# Patient Record
Sex: FEMALE | Race: WHITE | NOT HISPANIC OR LATINO | URBAN - METROPOLITAN AREA
[De-identification: names, ages, dates, MRNs, and addresses within clinical notes are randomized per-mention and may not be internally consistent; named-entity substitution may affect disease eponyms.]

---

## 2017-12-22 ENCOUNTER — EMERGENCY (EMERGENCY)
Facility: HOSPITAL | Age: 32
LOS: 1 days | Discharge: HOME | End: 2017-12-22
Attending: EMERGENCY MEDICINE | Admitting: EMERGENCY MEDICINE
Payer: MEDICAID

## 2017-12-22 VITALS
OXYGEN SATURATION: 100 % | HEART RATE: 78 BPM | TEMPERATURE: 98 F | DIASTOLIC BLOOD PRESSURE: 88 MMHG | RESPIRATION RATE: 18 BRPM | SYSTOLIC BLOOD PRESSURE: 120 MMHG

## 2017-12-22 VITALS
SYSTOLIC BLOOD PRESSURE: 125 MMHG | OXYGEN SATURATION: 100 % | TEMPERATURE: 98 F | HEART RATE: 80 BPM | RESPIRATION RATE: 19 BRPM | DIASTOLIC BLOOD PRESSURE: 91 MMHG

## 2017-12-22 LAB
ALBUMIN SERPL ELPH-MCNC: 4.4 G/DL — SIGNIFICANT CHANGE UP (ref 3.3–5)
ALP SERPL-CCNC: 66 U/L — SIGNIFICANT CHANGE UP (ref 40–120)
ALT FLD-CCNC: 40 U/L — HIGH (ref 4–33)
APPEARANCE UR: CLEAR — SIGNIFICANT CHANGE UP
AST SERPL-CCNC: 20 U/L — SIGNIFICANT CHANGE UP (ref 4–32)
BASOPHILS # BLD AUTO: 0.03 K/UL — SIGNIFICANT CHANGE UP (ref 0–0.2)
BASOPHILS NFR BLD AUTO: 0.4 % — SIGNIFICANT CHANGE UP (ref 0–2)
BILIRUB SERPL-MCNC: 0.2 MG/DL — SIGNIFICANT CHANGE UP (ref 0.2–1.2)
BILIRUB UR-MCNC: NEGATIVE — SIGNIFICANT CHANGE UP
BLOOD UR QL VISUAL: HIGH
BUN SERPL-MCNC: 16 MG/DL — SIGNIFICANT CHANGE UP (ref 7–23)
CALCIUM SERPL-MCNC: 9.2 MG/DL — SIGNIFICANT CHANGE UP (ref 8.4–10.5)
CHLORIDE SERPL-SCNC: 105 MMOL/L — SIGNIFICANT CHANGE UP (ref 98–107)
CO2 SERPL-SCNC: 25 MMOL/L — SIGNIFICANT CHANGE UP (ref 22–31)
COLOR SPEC: YELLOW — SIGNIFICANT CHANGE UP
CREAT SERPL-MCNC: 0.71 MG/DL — SIGNIFICANT CHANGE UP (ref 0.5–1.3)
EOSINOPHIL # BLD AUTO: 0.02 K/UL — SIGNIFICANT CHANGE UP (ref 0–0.5)
EOSINOPHIL NFR BLD AUTO: 0.3 % — SIGNIFICANT CHANGE UP (ref 0–6)
GLUCOSE SERPL-MCNC: 88 MG/DL — SIGNIFICANT CHANGE UP (ref 70–99)
GLUCOSE UR-MCNC: NEGATIVE — SIGNIFICANT CHANGE UP
HCG SERPL-ACNC: < 5 MIU/ML — SIGNIFICANT CHANGE UP
HCT VFR BLD CALC: 40.7 % — SIGNIFICANT CHANGE UP (ref 34.5–45)
HGB BLD-MCNC: 13.8 G/DL — SIGNIFICANT CHANGE UP (ref 11.5–15.5)
IMM GRANULOCYTES # BLD AUTO: 0.02 # — SIGNIFICANT CHANGE UP
IMM GRANULOCYTES NFR BLD AUTO: 0.3 % — SIGNIFICANT CHANGE UP (ref 0–1.5)
KETONES UR-MCNC: NEGATIVE — SIGNIFICANT CHANGE UP
LEUKOCYTE ESTERASE UR-ACNC: NEGATIVE — SIGNIFICANT CHANGE UP
LYMPHOCYTES # BLD AUTO: 2.24 K/UL — SIGNIFICANT CHANGE UP (ref 1–3.3)
LYMPHOCYTES # BLD AUTO: 30.2 % — SIGNIFICANT CHANGE UP (ref 13–44)
MCHC RBC-ENTMCNC: 29.2 PG — SIGNIFICANT CHANGE UP (ref 27–34)
MCHC RBC-ENTMCNC: 33.9 % — SIGNIFICANT CHANGE UP (ref 32–36)
MCV RBC AUTO: 86 FL — SIGNIFICANT CHANGE UP (ref 80–100)
MONOCYTES # BLD AUTO: 0.48 K/UL — SIGNIFICANT CHANGE UP (ref 0–0.9)
MONOCYTES NFR BLD AUTO: 6.5 % — SIGNIFICANT CHANGE UP (ref 2–14)
MUCOUS THREADS # UR AUTO: SIGNIFICANT CHANGE UP
NEUTROPHILS # BLD AUTO: 4.63 K/UL — SIGNIFICANT CHANGE UP (ref 1.8–7.4)
NEUTROPHILS NFR BLD AUTO: 62.3 % — SIGNIFICANT CHANGE UP (ref 43–77)
NITRITE UR-MCNC: NEGATIVE — SIGNIFICANT CHANGE UP
NON-SQ EPI CELLS # UR AUTO: <1 — SIGNIFICANT CHANGE UP
NRBC # FLD: 0 — SIGNIFICANT CHANGE UP
PH UR: 6 — SIGNIFICANT CHANGE UP (ref 4.6–8)
PLATELET # BLD AUTO: 267 K/UL — SIGNIFICANT CHANGE UP (ref 150–400)
PMV BLD: 10.9 FL — SIGNIFICANT CHANGE UP (ref 7–13)
POTASSIUM SERPL-MCNC: 4.3 MMOL/L — SIGNIFICANT CHANGE UP (ref 3.5–5.3)
POTASSIUM SERPL-SCNC: 4.3 MMOL/L — SIGNIFICANT CHANGE UP (ref 3.5–5.3)
PROT SERPL-MCNC: 7.1 G/DL — SIGNIFICANT CHANGE UP (ref 6–8.3)
PROT UR-MCNC: 30 MG/DL — HIGH
RBC # BLD: 4.73 M/UL — SIGNIFICANT CHANGE UP (ref 3.8–5.2)
RBC # FLD: 12.3 % — SIGNIFICANT CHANGE UP (ref 10.3–14.5)
RBC CASTS # UR COMP ASSIST: SIGNIFICANT CHANGE UP (ref 0–?)
SODIUM SERPL-SCNC: 142 MMOL/L — SIGNIFICANT CHANGE UP (ref 135–145)
SP GR SPEC: 1.03 — SIGNIFICANT CHANGE UP (ref 1–1.04)
SQUAMOUS # UR AUTO: SIGNIFICANT CHANGE UP
UROBILINOGEN FLD QL: NORMAL MG/DL — SIGNIFICANT CHANGE UP
WBC # BLD: 7.42 K/UL — SIGNIFICANT CHANGE UP (ref 3.8–10.5)
WBC # FLD AUTO: 7.42 K/UL — SIGNIFICANT CHANGE UP (ref 3.8–10.5)
WBC UR QL: SIGNIFICANT CHANGE UP (ref 0–?)

## 2017-12-22 PROCEDURE — 76830 TRANSVAGINAL US NON-OB: CPT | Mod: 26

## 2017-12-22 PROCEDURE — 99284 EMERGENCY DEPT VISIT MOD MDM: CPT

## 2017-12-22 RX ORDER — KETOROLAC TROMETHAMINE 30 MG/ML
15 SYRINGE (ML) INJECTION ONCE
Qty: 0 | Refills: 0 | Status: DISCONTINUED | OUTPATIENT
Start: 2017-12-22 | End: 2017-12-22

## 2017-12-22 RX ORDER — IBUPROFEN 200 MG
1 TABLET ORAL
Qty: 28 | Refills: 0 | OUTPATIENT
Start: 2017-12-22 | End: 2017-12-28

## 2017-12-22 NOTE — ED PROVIDER NOTE - OBJECTIVE STATEMENT
32F  pmh PCOS, irregular menstrual cycle, here w/ right lower abdominal pain 7-8/10 that came about 3 days ago on its own. Since then it has been intermittent and comes and goes and nothing has helped it. Pt says there is associated nausea but no vomiting, fevers, cp, dysuria, hematuria, increased frwquency, or sob. BMs have been normal with no blood or diarrhea. Pt does say that 1.5 weeks ago she had an abnormally heavy period but with no clots. Pt is sexually active. Pt saw her pmd today who sent her here for a CT scan.

## 2017-12-22 NOTE — ED ADULT NURSE NOTE - OBJECTIVE STATEMENT
Pt A+OX3 c/o RLQ abd pain x2 days.  Denies N/V/D.  LMP 2 weeks ago.  Saw PMD who sent pt here to r/o appendicitis.  Kept NPO.  Urine specimen sent as ordered.

## 2017-12-22 NOTE — ED ADULT TRIAGE NOTE - CHIEF COMPLAINT QUOTE
Co rlq abdominal pain with nausea and poor appetite x 3 days. Seen by pcp today and sent to ED to ro appendicitis or ovarian cysts.

## 2017-12-22 NOTE — ED PROVIDER NOTE - PLAN OF CARE
1) Please follow-up with your primary care doctor within the next 3 days.  If you cannot follow-up with your doctor(s), please return to the ED for any urgent issues.  2) If you have any worsening of symptoms or any other concerns please return to the ED immediately.  3) Please continue taking your home medications as directed.  4) You may have been given a copy of your labs and/or imaging.  Please go over these with your primary care doctor.  5) A prescription was sent to your pharmacy. Please take as directed.

## 2017-12-22 NOTE — ED PROVIDER NOTE - ATTENDING CONTRIBUTION TO CARE
Dr. Garcia:  I have personally performed a face to face bedside history and physical examination of this patient. I have discussed the history, examination, review of systems, assessment and plan of management with the resident. I have reviewed the electronic medical record and amended it to reflect my history, review of systems, physical exam, assessment and plan.    32F h/o PCOS presents with RLQ/pelvic pain x 3 days, intermittent, worse after eating.  +nausea intermittently.  Denies fever/chills, cp, sob, v/d, urinary symptoms.  Sent by PCP for further evaluation.    Exam:  - nad  - rrr  - ctab  - abd soft, mild TTP RLQ  - pelvic exam (chaperone Dr. Santos) with no CMT, +R adnexal TTP    A/P  - pain more adnexal than RLQ, consider ovarian cyst/torsion, r/o pregnancy/ectopic  - cbc, cmp, hcg, ua, urine culture  - transvaginal ultrasound  - if US, will obtain CT to r/o appendicitis

## 2017-12-22 NOTE — ED PROVIDER NOTE - MEDICAL DECISION MAKING DETAILS
32F  F pmhx PCOS here w/ intermittent RLQ pain 8/10 x 3 days. Pt well appearing on exam but substantial ttp in adnexal region. Pelvic exam findings similar. No urinary or GI symptoms. Plan for abdominal U/S, cbc, cmp, serum hcg, UA.

## 2017-12-22 NOTE — ED PROVIDER NOTE - CARE PLAN
Principal Discharge DX:	Abdominal pain  Instructions for follow-up, activity and diet:	1) Please follow-up with your primary care doctor within the next 3 days.  If you cannot follow-up with your doctor(s), please return to the ED for any urgent issues.  2) If you have any worsening of symptoms or any other concerns please return to the ED immediately.  3) Please continue taking your home medications as directed.  4) You may have been given a copy of your labs and/or imaging.  Please go over these with your primary care doctor.  5) A prescription was sent to your pharmacy. Please take as directed.

## 2017-12-22 NOTE — ED PROVIDER NOTE - PROGRESS NOTE DETAILS
US shows polycystic ovaries but no other emergent findings.  Discussed obtaining CT to r/o appendicitis.  Pt states she would prefer to be discharged home secondary to [employer] observances for sun-down.  Understands risks of missed pathology by not obtaining CT at this time; states she will return to ED for any worsening signs/symptoms.  Given copy of results and return precautions.

## 2017-12-23 LAB — SPECIMEN SOURCE: SIGNIFICANT CHANGE UP

## 2017-12-24 LAB — BACTERIA UR CULT: SIGNIFICANT CHANGE UP

## 2019-01-03 ENCOUNTER — OUTPATIENT (OUTPATIENT)
Dept: OUTPATIENT SERVICES | Facility: HOSPITAL | Age: 34
LOS: 1 days | End: 2019-01-03

## 2019-01-03 VITALS
OXYGEN SATURATION: 99 % | SYSTOLIC BLOOD PRESSURE: 98 MMHG | HEART RATE: 90 BPM | DIASTOLIC BLOOD PRESSURE: 60 MMHG | RESPIRATION RATE: 18 BRPM | WEIGHT: 156.97 LBS | TEMPERATURE: 98 F | HEIGHT: 61 IN

## 2019-01-03 DIAGNOSIS — O34.32 MATERNAL CARE FOR CERVICAL INCOMPETENCE, SECOND TRIMESTER: ICD-10-CM

## 2019-01-03 DIAGNOSIS — N88.3 INCOMPETENCE OF CERVIX UTERI: ICD-10-CM

## 2019-01-03 LAB
BLD GP AB SCN SERPL QL: NEGATIVE — SIGNIFICANT CHANGE UP
HCT VFR BLD CALC: 34.1 % — LOW (ref 34.5–45)
HGB BLD-MCNC: 11.8 G/DL — SIGNIFICANT CHANGE UP (ref 11.5–15.5)
MCHC RBC-ENTMCNC: 29.9 PG — SIGNIFICANT CHANGE UP (ref 27–34)
MCHC RBC-ENTMCNC: 34.6 % — SIGNIFICANT CHANGE UP (ref 32–36)
MCV RBC AUTO: 86.3 FL — SIGNIFICANT CHANGE UP (ref 80–100)
NRBC # FLD: 0 — SIGNIFICANT CHANGE UP
PLATELET # BLD AUTO: 245 K/UL — SIGNIFICANT CHANGE UP (ref 150–400)
PMV BLD: 10.5 FL — SIGNIFICANT CHANGE UP (ref 7–13)
RBC # BLD: 3.95 M/UL — SIGNIFICANT CHANGE UP (ref 3.8–5.2)
RBC # FLD: 12.5 % — SIGNIFICANT CHANGE UP (ref 10.3–14.5)
RH IG SCN BLD-IMP: NEGATIVE — SIGNIFICANT CHANGE UP
WBC # BLD: 8.38 K/UL — SIGNIFICANT CHANGE UP (ref 3.8–10.5)
WBC # FLD AUTO: 8.38 K/UL — SIGNIFICANT CHANGE UP (ref 3.8–10.5)

## 2019-01-03 NOTE — H&P PST ADULT - HISTORY OF PRESENT ILLNESS
32 y/o female  at 11 weeks gestation  with hx of incompetent cervix and pre-term delivery in previous pregnancy scheduled for cerclage of cervix on 2019. She states she was diagnosed with strep throat last week and completed a course of penicillin.

## 2019-01-03 NOTE — H&P PST ADULT - PROBLEM SELECTOR PLAN 1
scheduled for cerclage of cervix on 01/17/2019.  Pre-Op instructions provided to patient.  Stop PNV 01/10/2019.

## 2019-01-07 ENCOUNTER — TRANSCRIPTION ENCOUNTER (OUTPATIENT)
Age: 34
End: 2019-01-07

## 2019-01-08 ENCOUNTER — TRANSCRIPTION ENCOUNTER (OUTPATIENT)
Age: 34
End: 2019-01-08

## 2019-01-08 ENCOUNTER — OUTPATIENT (OUTPATIENT)
Dept: INPATIENT UNIT | Facility: HOSPITAL | Age: 34
LOS: 1 days | Discharge: HOME | End: 2019-01-08

## 2019-01-08 VITALS
DIASTOLIC BLOOD PRESSURE: 61 MMHG | OXYGEN SATURATION: 99 % | HEART RATE: 88 BPM | SYSTOLIC BLOOD PRESSURE: 105 MMHG | RESPIRATION RATE: 20 BRPM

## 2019-01-08 VITALS
DIASTOLIC BLOOD PRESSURE: 67 MMHG | SYSTOLIC BLOOD PRESSURE: 100 MMHG | HEART RATE: 84 BPM | RESPIRATION RATE: 16 BRPM | TEMPERATURE: 99 F | OXYGEN SATURATION: 100 %

## 2019-01-08 VITALS — SYSTOLIC BLOOD PRESSURE: 97 MMHG | HEART RATE: 92 BPM | DIASTOLIC BLOOD PRESSURE: 59 MMHG

## 2019-01-08 DIAGNOSIS — O34.30 MATERNAL CARE FOR CERVICAL INCOMPETENCE, UNSPECIFIED TRIMESTER: ICD-10-CM

## 2019-01-08 LAB
BASOPHILS # BLD AUTO: 0.05 K/UL — SIGNIFICANT CHANGE UP (ref 0–0.2)
BASOPHILS NFR BLD AUTO: 0.5 % — SIGNIFICANT CHANGE UP (ref 0–2)
BLD GP AB SCN SERPL QL: NEGATIVE — SIGNIFICANT CHANGE UP
EOSINOPHIL # BLD AUTO: 0.05 K/UL — SIGNIFICANT CHANGE UP (ref 0–0.5)
EOSINOPHIL NFR BLD AUTO: 0.5 % — SIGNIFICANT CHANGE UP (ref 0–6)
HCT VFR BLD CALC: 37 % — SIGNIFICANT CHANGE UP (ref 34.5–45)
HGB BLD-MCNC: 12.6 G/DL — SIGNIFICANT CHANGE UP (ref 11.5–15.5)
IMM GRANULOCYTES NFR BLD AUTO: 0.5 % — SIGNIFICANT CHANGE UP (ref 0–1.5)
LYMPHOCYTES # BLD AUTO: 2.61 K/UL — SIGNIFICANT CHANGE UP (ref 1–3.3)
LYMPHOCYTES # BLD AUTO: 26.7 % — SIGNIFICANT CHANGE UP (ref 13–44)
MCHC RBC-ENTMCNC: 29.6 PG — SIGNIFICANT CHANGE UP (ref 27–34)
MCHC RBC-ENTMCNC: 34.1 % — SIGNIFICANT CHANGE UP (ref 32–36)
MCV RBC AUTO: 86.9 FL — SIGNIFICANT CHANGE UP (ref 80–100)
MONOCYTES # BLD AUTO: 0.59 K/UL — SIGNIFICANT CHANGE UP (ref 0–0.9)
MONOCYTES NFR BLD AUTO: 6 % — SIGNIFICANT CHANGE UP (ref 2–14)
NEUTROPHILS # BLD AUTO: 6.42 K/UL — SIGNIFICANT CHANGE UP (ref 1.8–7.4)
NEUTROPHILS NFR BLD AUTO: 65.8 % — SIGNIFICANT CHANGE UP (ref 43–77)
NRBC # FLD: 0 K/UL — LOW (ref 25–125)
PLATELET # BLD AUTO: 266 K/UL — SIGNIFICANT CHANGE UP (ref 150–400)
PMV BLD: 10.4 FL — SIGNIFICANT CHANGE UP (ref 7–13)
RBC # BLD: 4.26 M/UL — SIGNIFICANT CHANGE UP (ref 3.8–5.2)
RBC # FLD: 12.6 % — SIGNIFICANT CHANGE UP (ref 10.3–14.5)
RH IG SCN BLD-IMP: NEGATIVE — SIGNIFICANT CHANGE UP
WBC # BLD: 9.77 K/UL — SIGNIFICANT CHANGE UP (ref 3.8–10.5)
WBC # FLD AUTO: 9.77 K/UL — SIGNIFICANT CHANGE UP (ref 3.8–10.5)

## 2019-01-08 RX ORDER — CITRIC ACID/SODIUM CITRATE 300-500 MG
30 SOLUTION, ORAL ORAL ONCE
Qty: 0 | Refills: 0 | Status: COMPLETED | OUTPATIENT
Start: 2019-01-08 | End: 2019-01-08

## 2019-01-08 RX ORDER — SODIUM CHLORIDE 9 MG/ML
1000 INJECTION, SOLUTION INTRAVENOUS ONCE
Qty: 0 | Refills: 0 | Status: COMPLETED | OUTPATIENT
Start: 2019-01-08 | End: 2019-01-08

## 2019-01-08 RX ORDER — FAMOTIDINE 10 MG/ML
20 INJECTION INTRAVENOUS ONCE
Qty: 0 | Refills: 0 | Status: COMPLETED | OUTPATIENT
Start: 2019-01-08 | End: 2019-01-08

## 2019-01-08 RX ORDER — INDOMETHACIN 50 MG
50 CAPSULE ORAL ONCE
Qty: 0 | Refills: 0 | Status: COMPLETED | OUTPATIENT
Start: 2019-01-08 | End: 2019-01-08

## 2019-01-08 RX ORDER — METOCLOPRAMIDE HCL 10 MG
10 TABLET ORAL ONCE
Qty: 0 | Refills: 0 | Status: COMPLETED | OUTPATIENT
Start: 2019-01-08 | End: 2019-01-08

## 2019-01-08 RX ORDER — INDOMETHACIN 50 MG
1 CAPSULE ORAL
Qty: 4 | Refills: 0
Start: 2019-01-08

## 2019-01-08 RX ORDER — SODIUM CHLORIDE 9 MG/ML
1000 INJECTION, SOLUTION INTRAVENOUS
Qty: 0 | Refills: 0 | Status: DISCONTINUED | OUTPATIENT
Start: 2019-01-08 | End: 2019-01-08

## 2019-01-08 RX ORDER — ACETAMINOPHEN 500 MG
1000 TABLET ORAL ONCE
Qty: 0 | Refills: 0 | Status: COMPLETED | OUTPATIENT
Start: 2019-01-08 | End: 2019-01-08

## 2019-01-08 RX ORDER — INDOMETHACIN 50 MG
25 CAPSULE ORAL EVERY 6 HOURS
Qty: 0 | Refills: 0 | Status: DISCONTINUED | OUTPATIENT
Start: 2019-01-08 | End: 2019-01-08

## 2019-01-08 RX ADMIN — Medication 25 MILLIGRAM(S): at 16:01

## 2019-01-08 RX ADMIN — Medication 1000 MILLIGRAM(S): at 14:47

## 2019-01-08 RX ADMIN — SODIUM CHLORIDE 125 MILLILITER(S): 9 INJECTION, SOLUTION INTRAVENOUS at 15:12

## 2019-01-08 RX ADMIN — Medication 400 MILLIGRAM(S): at 14:32

## 2019-01-08 RX ADMIN — FAMOTIDINE 20 MILLIGRAM(S): 10 INJECTION INTRAVENOUS at 09:16

## 2019-01-08 RX ADMIN — SODIUM CHLORIDE 2000 MILLILITER(S): 9 INJECTION, SOLUTION INTRAVENOUS at 08:55

## 2019-01-08 RX ADMIN — Medication 10 MILLIGRAM(S): at 09:16

## 2019-01-08 RX ADMIN — Medication 50 MILLIGRAM(S): at 09:30

## 2019-01-08 RX ADMIN — Medication 30 MILLILITER(S): at 09:17

## 2019-01-08 NOTE — DISCHARGE NOTE ANTEPARTUM - MEDICATION SUMMARY - MEDICATIONS TO TAKE
I will START or STAY ON the medications listed below when I get home from the hospital:    indomethacin 25 mg oral capsule  -- 1 cap(s) by mouth every 6 hours  -- Indication: For contractions

## 2019-01-08 NOTE — DISCHARGE NOTE ANTEPARTUM - HOSPITAL COURSE
32 yo  15w3d presents for Shirodkar cerclage placement for history of incompetent cervix. Surgery uncomplicated. FHR postoperatively 152 bpm.

## 2019-01-08 NOTE — DISCHARGE NOTE ANTEPARTUM - PLAN OF CARE
Continuation of pregnancy Take indocin every 6 hours for 24 hours. No bedrest necessary. Contact your physician if you begin to have frequency CTX, LOF, vaginal bleeding.

## 2019-01-08 NOTE — DISCHARGE NOTE ANTEPARTUM - PATIENT PORTAL LINK FT
You can access the [facility] Patient [facility], offered by [facility], by registering with the following website: [email or web address]

## 2019-01-08 NOTE — DISCHARGE NOTE ANTEPARTUM - CARE PLAN
Principal Discharge DX:	Incompetent cervix  Goal:	Continuation of pregnancy  Assessment and plan of treatment:	Take indocin every 6 hours for 24 hours. No bedrest necessary. Contact your physician if you begin to have frequency CTX, LOF, vaginal bleeding.

## 2019-01-08 NOTE — DISCHARGE NOTE ANTEPARTUM - CARE PROVIDER_API CALL
Rony Ybarra), MaternalFetal Medicine; Obstetrics and Gynecology  [address] [address] [place] [address]  Room [address]  [place] [address]  Phone: [phone]  Fax: [phone]

## 2019-02-25 ENCOUNTER — OUTPATIENT (OUTPATIENT)
Dept: OUTPATIENT SERVICES | Age: 34
LOS: 1 days | Discharge: HOME | End: 2019-02-25

## 2019-02-26 ENCOUNTER — APPOINTMENT (OUTPATIENT)
Dept: PEDIATRIC CARDIOLOGY | Facility: CLINIC | Age: 34
End: 2019-02-26
Payer: MEDICAID

## 2019-02-26 PROCEDURE — 76820 UMBILICAL ARTERY ECHO: CPT

## 2019-02-26 PROCEDURE — 76825 ECHO EXAM OF FETAL HEART: CPT

## 2019-02-26 PROCEDURE — 76827 ECHO EXAM OF FETAL HEART: CPT

## 2019-02-26 PROCEDURE — 93325 DOPPLER ECHO COLOR FLOW MAPG: CPT | Mod: 59

## 2019-02-26 PROCEDURE — 99203 OFFICE O/P NEW LOW 30 MIN: CPT | Mod: 25

## 2019-03-11 ENCOUNTER — APPOINTMENT (OUTPATIENT)
Dept: PEDIATRIC CARDIOLOGY | Facility: CLINIC | Age: 34
End: 2019-03-11

## 2019-03-20 ENCOUNTER — OUTPATIENT (OUTPATIENT)
Dept: INPATIENT UNIT | Facility: HOSPITAL | Age: 34
LOS: 1 days | Discharge: HOME | End: 2019-03-20
Payer: MEDICAID

## 2019-03-20 DIAGNOSIS — O26.899 OTHER SPECIFIED PREGNANCY RELATED CONDITIONS, UNSPECIFIED TRIMESTER: ICD-10-CM

## 2019-03-20 DIAGNOSIS — Z3A.00 WEEKS OF GESTATION OF PREGNANCY NOT SPECIFIED: ICD-10-CM

## 2019-03-20 LAB
APPEARANCE UR: CLEAR — SIGNIFICANT CHANGE UP
BACTERIA # UR AUTO: SIGNIFICANT CHANGE UP
BILIRUB UR-MCNC: NEGATIVE — SIGNIFICANT CHANGE UP
BLOOD UR QL VISUAL: SIGNIFICANT CHANGE UP
COLOR SPEC: YELLOW — SIGNIFICANT CHANGE UP
GLUCOSE UR-MCNC: NEGATIVE — SIGNIFICANT CHANGE UP
KETONES UR-MCNC: SIGNIFICANT CHANGE UP
LEUKOCYTE ESTERASE UR-ACNC: NEGATIVE — SIGNIFICANT CHANGE UP
NITRITE UR-MCNC: NEGATIVE — SIGNIFICANT CHANGE UP
PH UR: 6 — SIGNIFICANT CHANGE UP (ref 5–8)
PROT UR-MCNC: 30 — SIGNIFICANT CHANGE UP
RBC CASTS # UR COMP ASSIST: HIGH (ref 0–?)
SP GR SPEC: 1.03 — SIGNIFICANT CHANGE UP (ref 1–1.04)
SQUAMOUS # UR AUTO: SIGNIFICANT CHANGE UP
UROBILINOGEN FLD QL: SIGNIFICANT CHANGE UP
WBC UR QL: HIGH (ref 0–?)

## 2019-03-20 PROCEDURE — 59025 FETAL NON-STRESS TEST: CPT | Mod: 26

## 2019-03-21 LAB — SPECIMEN SOURCE: SIGNIFICANT CHANGE UP

## 2019-03-22 LAB — BACTERIA UR CULT: SIGNIFICANT CHANGE UP

## 2019-05-13 ENCOUNTER — OUTPATIENT (OUTPATIENT)
Dept: INPATIENT UNIT | Facility: HOSPITAL | Age: 34
LOS: 1 days | Discharge: HOME | End: 2019-05-13
Payer: MEDICAID

## 2019-05-13 VITALS — HEART RATE: 84 BPM | SYSTOLIC BLOOD PRESSURE: 109 MMHG | DIASTOLIC BLOOD PRESSURE: 69 MMHG

## 2019-05-13 DIAGNOSIS — O26.899 OTHER SPECIFIED PREGNANCY RELATED CONDITIONS, UNSPECIFIED TRIMESTER: ICD-10-CM

## 2019-05-13 DIAGNOSIS — Z3A.00 WEEKS OF GESTATION OF PREGNANCY NOT SPECIFIED: ICD-10-CM

## 2019-05-13 LAB
APPEARANCE UR: CLEAR — SIGNIFICANT CHANGE UP
BACTERIA # UR AUTO: NEGATIVE — SIGNIFICANT CHANGE UP
BILIRUB UR-MCNC: NEGATIVE — SIGNIFICANT CHANGE UP
BLOOD UR QL VISUAL: NEGATIVE — SIGNIFICANT CHANGE UP
COLOR SPEC: SIGNIFICANT CHANGE UP
GLUCOSE UR-MCNC: NEGATIVE — SIGNIFICANT CHANGE UP
HYALINE CASTS # UR AUTO: NEGATIVE — SIGNIFICANT CHANGE UP
KETONES UR-MCNC: SIGNIFICANT CHANGE UP
LEUKOCYTE ESTERASE UR-ACNC: SIGNIFICANT CHANGE UP
NITRITE UR-MCNC: NEGATIVE — SIGNIFICANT CHANGE UP
PH UR: 6 — SIGNIFICANT CHANGE UP (ref 5–8)
PROT UR-MCNC: NEGATIVE — SIGNIFICANT CHANGE UP
RBC CASTS # UR COMP ASSIST: SIGNIFICANT CHANGE UP (ref 0–?)
SP GR SPEC: 1.01 — SIGNIFICANT CHANGE UP (ref 1–1.04)
SQUAMOUS # UR AUTO: SIGNIFICANT CHANGE UP
UROBILINOGEN FLD QL: NORMAL — SIGNIFICANT CHANGE UP
WBC UR QL: HIGH (ref 0–?)

## 2019-05-13 PROCEDURE — 59025 FETAL NON-STRESS TEST: CPT | Mod: 26

## 2019-05-13 RX ORDER — SODIUM CHLORIDE 9 MG/ML
1000 INJECTION, SOLUTION INTRAVENOUS ONCE
Refills: 0 | Status: COMPLETED | OUTPATIENT
Start: 2019-05-13 | End: 2019-05-13

## 2019-05-13 RX ADMIN — SODIUM CHLORIDE 2000 MILLILITER(S): 9 INJECTION, SOLUTION INTRAVENOUS at 21:47

## 2019-05-13 NOTE — OB PROVIDER TRIAGE NOTE - PMH
Incompetent cervix    Inguinal Hernia    Miscarriage  5 weeks gestation, 2007  Vaginal delivery  2008 F 6#

## 2019-05-13 NOTE — OB PROVIDER TRIAGE NOTE - HISTORY OF PRESENT ILLNESS
Dr. Ybarra's pt. is a 32y/o EGA 33.2wks . Pt. reports of lower back pain and pelvic pressure since last night. Pt. denies abdominal cramping, LOF, VB, urinary frequency or dysuria.

## 2019-05-13 NOTE — OB PROVIDER TRIAGE NOTE - ADDITIONAL INSTRUCTIONS
No evidence of  labor at this time. Discussed findings with Dr. Ybarra. Pt. d/c'd home. Pt. to follow up with next OB appointment on Friday. Pt. instructed to return to triage with increase abdominal cramping, LOF, VB, or decrease FM. Increase PO hydration encouraged. Daily kick counts reviewed.

## 2019-05-13 NOTE — OB PROVIDER TRIAGE NOTE - NSOBPROVIDERNOTE_OBGYN_ALL_OB_FT
Dr. Ybarra's pt. is a 34y/o EGA 33.2wks . Pt. reports of lower back pain and pelvic pressure since last night. Pt. denies abdominal cramping, LOF, VB, urinary frequency or dysuria.     AP: Cervical insuffiencey cerclage placed at 16wks  Medical Hx: Denies  Surgical Hx: Inguinal hernia repair as a child  OBGYN Hx:   SABx1    FT 3/31/2008 6-0  Primary  2011 TIUP @34wks 3-0, 4-0  Repeat  2013 FT 6-13 for IUGR  Meds:PNV  NKDA    Assessment/Plan  TAS: Cephalic presentation, posterior placenta, DELROY:10.03, EFW:1835gm, BPP:  Speculum: Pt. declined speculum exam  TVS: CL:2.3-2.5 no funneling or dynamic changes noted  U/A:Pending Dr. Ybarra's pt. is a 32y/o EGA 33.2wks . Pt. reports of lower back pain and pelvic pressure since last night. Pt. denies abdominal cramping, LOF, VB, urinary frequency or dysuria.     AP: Cervical insuffiencey cerclage placed at 16wks  Medical Hx: Denies  Surgical Hx: Inguinal hernia repair as a child  OBGYN Hx:   SABx1    FT 3/31/2008 6-0  Primary  2011 TIUP @34wks 3-0, 4-0  Repeat  2013 FT 6-13 for IUGR  Meds:PNV  NKDA    Assessment/Plan  TAS: Cephalic presentation, posterior placenta, DELROY:10.03, EFW:1835gm, BPP:  Speculum: Pt. declined speculum exam  TVS: CL:2.3-2.5 no funneling or dynamic changes noted  U/A: Ketone:Small, Blood:Neg., Nitrite:Neg., Leukocyte Esterase:Moderate, WBC:11-25  FHR:120bpm, moderate variability, accels noted, no decels Dr. Ybarra's pt. is a 34y/o EGA 33.2wks . Pt. reports of lower back pain and pelvic pressure since last night. Pt. denies abdominal cramping, LOF, VB, urinary frequency or dysuria.     AP: Cervical insuffiencey cerclage placed at 16wks  Medical Hx: Denies  Surgical Hx: Inguinal hernia repair as a child  OBGYN Hx:   SABx1    FT 3/31/2008 6-0  Primary  2011 TIUP @34wks 3-0, 4-0  Repeat  2013 FT 6-13 for IUGR  Meds:PNV  NKDA    Assessment/Plan  TAS: Cephalic presentation, posterior placenta, DELROY:10.03, EFW:1835gm, BPP:  Speculum: Pt. declined speculum exam  TVS: CL:2.3-2.5 no funneling or dynamic changes noted  U/A: Ketone:Small, Blood:Neg., Nitrite:Neg., Leukocyte Esterase:Moderate, WBC:11-25  FHR:120bpm, moderate variability, accels noted, no decels  Uterine irritability but now no contractions on TOCO    1L RL bolus initiated    No evidence of  labor at this time. Discussed findings with Dr. Ybarra. Pt. d/c'd home. Pt. to follow up with next OB appointment on Friday. Pt. instructed to return to triage with increase abdominal cramping, LOF, VB, or decrease FM. Increase PO hydration encouraged. Daily kick counts reviewed.

## 2019-05-13 NOTE — OB PROVIDER TRIAGE NOTE - NSHPPHYSICALEXAM_GEN_ALL_CORE
TAS: Cephalic presentation, posterior placenta, DELROY:10.03, EFW:1835gm, BPP:8/8  Speculum: Pt. declined speculum exam  TVS: CL:2.3-2.5 no funneling or dynamic changes noted

## 2019-06-05 ENCOUNTER — OUTPATIENT (OUTPATIENT)
Dept: OUTPATIENT SERVICES | Facility: HOSPITAL | Age: 34
LOS: 1 days | End: 2019-06-05
Payer: MEDICAID

## 2019-06-05 VITALS
RESPIRATION RATE: 16 BRPM | HEART RATE: 80 BPM | SYSTOLIC BLOOD PRESSURE: 100 MMHG | TEMPERATURE: 97 F | DIASTOLIC BLOOD PRESSURE: 68 MMHG | WEIGHT: 164.02 LBS | HEIGHT: 61.5 IN

## 2019-06-05 DIAGNOSIS — O34.32 MATERNAL CARE FOR CERVICAL INCOMPETENCE, SECOND TRIMESTER: ICD-10-CM

## 2019-06-05 DIAGNOSIS — Z98.890 OTHER SPECIFIED POSTPROCEDURAL STATES: ICD-10-CM

## 2019-06-05 LAB
APPEARANCE UR: CLEAR — SIGNIFICANT CHANGE UP
BACTERIA # UR AUTO: NEGATIVE — SIGNIFICANT CHANGE UP
BILIRUB UR-MCNC: NEGATIVE — SIGNIFICANT CHANGE UP
BLOOD UR QL VISUAL: NEGATIVE — SIGNIFICANT CHANGE UP
COLOR SPEC: SIGNIFICANT CHANGE UP
GLUCOSE UR-MCNC: NEGATIVE — SIGNIFICANT CHANGE UP
HCT VFR BLD CALC: 33.5 % — LOW (ref 34.5–45)
HGB BLD-MCNC: 11.2 G/DL — LOW (ref 11.5–15.5)
HYALINE CASTS # UR AUTO: NEGATIVE — SIGNIFICANT CHANGE UP
KETONES UR-MCNC: NEGATIVE — SIGNIFICANT CHANGE UP
LEUKOCYTE ESTERASE UR-ACNC: SIGNIFICANT CHANGE UP
MCHC RBC-ENTMCNC: 28.6 PG — SIGNIFICANT CHANGE UP (ref 27–34)
MCHC RBC-ENTMCNC: 33.4 % — SIGNIFICANT CHANGE UP (ref 32–36)
MCV RBC AUTO: 85.5 FL — SIGNIFICANT CHANGE UP (ref 80–100)
NITRITE UR-MCNC: NEGATIVE — SIGNIFICANT CHANGE UP
NRBC # FLD: 0 K/UL — SIGNIFICANT CHANGE UP (ref 0–0)
PH UR: 6.5 — SIGNIFICANT CHANGE UP (ref 5–8)
PLATELET # BLD AUTO: 245 K/UL — SIGNIFICANT CHANGE UP (ref 150–400)
PMV BLD: 11 FL — SIGNIFICANT CHANGE UP (ref 7–13)
PROT UR-MCNC: NEGATIVE — SIGNIFICANT CHANGE UP
RBC # BLD: 3.92 M/UL — SIGNIFICANT CHANGE UP (ref 3.8–5.2)
RBC # FLD: 13 % — SIGNIFICANT CHANGE UP (ref 10.3–14.5)
RBC CASTS # UR COMP ASSIST: SIGNIFICANT CHANGE UP (ref 0–?)
SP GR SPEC: 1.01 — SIGNIFICANT CHANGE UP (ref 1–1.04)
SQUAMOUS # UR AUTO: SIGNIFICANT CHANGE UP
UROBILINOGEN FLD QL: NORMAL — SIGNIFICANT CHANGE UP
WBC # BLD: 8.19 K/UL — SIGNIFICANT CHANGE UP (ref 3.8–10.5)
WBC # FLD AUTO: 8.19 K/UL — SIGNIFICANT CHANGE UP (ref 3.8–10.5)
WBC UR QL: SIGNIFICANT CHANGE UP (ref 0–?)

## 2019-06-05 NOTE — H&P PST ADULT - NEUROLOGICAL DETAILS
normal strength/responds to verbal commands/responds to pain/alert and oriented x 3/sensation intact

## 2019-06-05 NOTE — H&P PST ADULT - MALLAMPATI CLASS
Class I (easy) - visualization of the soft palate, fauces, uvula, and both anterior and posterior pillars Class I (easy) - visualization of the soft palate, fauces, uvula, and both anterior and posterior pillars/with phonation

## 2019-06-05 NOTE — H&P PST ADULT - NSICDXPROBLEM_GEN_ALL_CORE_FT
PROBLEM DIAGNOSES  Problem: History of cervical cerclage  Assessment and Plan: Pt given preop instructions for scheduled surgery   Repeat T&S ordered DOS

## 2019-06-05 NOTE — H&P PST ADULT - ASSIST WITH
ECMO Shift Summary:    Patient transferred from [facility] on [facility].  All equipment is functioning and alarms are appropriately set. RPM's 5906-8329 with flow range 3.03-3.18 L/min. Sweep gas is at 5 LPM and FiO2 75%. Circuit remains free of air.  Fibrin noted at connectors on venous side of the oxygenator. Cannulas are secure with no bleeding from site. Extremities are cool and mottled.     Significant Shift Events:    None.    Vent settings:  Resp: 0  Ventilation Mode: SPCPS  Rate Set (breaths/minute): 10 breaths/min  PEEP (cm H2O): 15 cmH2O  Pressure Support (cm H2O): 10 cmH2O  Oxygen Concentration (%): 40 %  Inspiratory Pressure Set (cm H2O): 25  Inspiratory Time (seconds): 0.9 sec.    Heparin is off due to bleeding concerns.  ACT's 196-237.    Urine output minimal and coffee colored, blood loss was small with slight oozing from line sites. Product given included one unit platelets for low count.      Intake/Output Summary (Last 24 hours) at 02/13/18 0741  Last data filed at 02/13/18 0700   Gross per 24 hour   Intake           455.41 ml   Output              170 ml   Net           285.41 ml       ECHO:  No results found for this or any previous visit.No results found for this or any previous visit.    CXR:  Recent Results (from the past 24 hour(s))   XR Chest Port 1 View    Impression    IMPRESSION:   1. Left-sided pneumothorax as well as bilateral subcutaneous emphysema  and pleural effusions.  2. Paucity of small bowel gas.  3. Lines and tubes as above.   XR Abdomen Port 1 View    Impression    IMPRESSION:   1. Left-sided pneumothorax as well as bilateral subcutaneous emphysema  and pleural effusions.  2. Paucity of small bowel gas.  3. Lines and tubes as above.       Labs:    Recent Labs  Lab 02/13/18  0710 02/13/18  0620 02/13/18  0518 02/13/18  0459 02/13/18  0444   PH 7.32* 7.26* 7.30*  --  7.32*   PCO2 41 50* 43  --  42   PO2 168* 158* 389*  --  505*   HCO3 21 22 21  --  22   O2PER 40 40 40.0 40.0 40       Lab  Results   Component Value Date    HGB 14.5 02/13/2018    PHGB 70 (H) 02/13/2018    PLT 49 (LL) 02/13/2018    FIBR 266 02/13/2018    INR 2.00 (H) 02/13/2018    PTT 72 (H) 02/13/2018    DD >20.0 (H) 02/13/2018    AXA <0.10 02/13/2018         Plan is to continue ECMO support.      Kimberlee Yates, RRT  2/13/2018 7:41 AM                 walking

## 2019-06-05 NOTE — H&P PST ADULT - NSICDXPASTMEDICALHX_GEN_ALL_CORE_FT
PAST MEDICAL HISTORY:  Incompetent cervix     Inguinal Hernia     Miscarriage 5 weeks gestation, 2007    Vaginal delivery 2008 F 6#

## 2019-06-05 NOTE — H&P PST ADULT - HISTORY OF PRESENT ILLNESS
34 y/o female  at 11 weeks gestation  with hx of incompetent cervix and pre-term delivery in previous pregnancy scheduled for cerclage of cervix on 2019. She states she was diagnosed with strep throat last week and completed a course of penicillin. Pt is a 33 yr old female scheduled for Cerclage removal 19 with Dr Ybarra - pt had Cerclage placed  and is now Gestation 37 weeks , JOHN 19, M1 - with   at 40 weeks,   at 34 weeks with twins,  at 40 weeks and a missed ab . Pt hx of Cerclage for all pregnancies. Pt denies vaginal bleeding or contractions at this time - Pt last seen by OB last week and fetal heartbeat heard and pt confirms fetal movement today.

## 2019-06-05 NOTE — H&P PST ADULT - NSICDXPASTSURGICALHX_GEN_ALL_CORE_FT
PAST SURGICAL HISTORY:  History of  x2    History of Cerclage, Currently Pregnant 2011    Inguinal Hernia Repair

## 2019-06-05 NOTE — H&P PST ADULT - NEGATIVE NEUROLOGICAL SYMPTOMS
no tremors/no transient paralysis/no weakness/no paresthesias/no generalized seizures/no focal seizures/no syncope

## 2019-06-06 LAB
ANTIBODY ID 1_1: SIGNIFICANT CHANGE UP
BLD GP AB SCN SERPL QL: POSITIVE — SIGNIFICANT CHANGE UP
DAT POLY-SP REAG RBC QL: NEGATIVE — SIGNIFICANT CHANGE UP
RH IG SCN BLD-IMP: NEGATIVE — SIGNIFICANT CHANGE UP
T PALLIDUM AB TITR SER: NEGATIVE — SIGNIFICANT CHANGE UP

## 2019-06-06 PROCEDURE — 86077 PHYS BLOOD BANK SERV XMATCH: CPT

## 2019-06-11 ENCOUNTER — TRANSCRIPTION ENCOUNTER (OUTPATIENT)
Age: 34
End: 2019-06-11

## 2019-06-11 ENCOUNTER — OUTPATIENT (OUTPATIENT)
Dept: INPATIENT UNIT | Facility: HOSPITAL | Age: 34
LOS: 1 days | End: 2019-06-11

## 2019-06-11 VITALS
TEMPERATURE: 98 F | SYSTOLIC BLOOD PRESSURE: 107 MMHG | RESPIRATION RATE: 20 BRPM | DIASTOLIC BLOOD PRESSURE: 69 MMHG | WEIGHT: 164.91 LBS | HEART RATE: 82 BPM | HEIGHT: 60 IN

## 2019-06-11 DIAGNOSIS — N88.3 INCOMPETENCE OF CERVIX UTERI: ICD-10-CM

## 2019-06-11 DIAGNOSIS — Z3A.00 WEEKS OF GESTATION OF PREGNANCY NOT SPECIFIED: ICD-10-CM

## 2019-06-11 DIAGNOSIS — O34.211 MATERNAL CARE FOR LOW TRANSVERSE SCAR FROM PREVIOUS CESAREAN DELIVERY: ICD-10-CM

## 2019-06-11 DIAGNOSIS — O34.30 MATERNAL CARE FOR CERVICAL INCOMPETENCE, UNSPECIFIED TRIMESTER: ICD-10-CM

## 2019-06-11 LAB
ANTIBODY ID 1_1: SIGNIFICANT CHANGE UP
BLD GP AB SCN SERPL QL: POSITIVE — SIGNIFICANT CHANGE UP
DAT POLY-SP REAG RBC QL: NEGATIVE — SIGNIFICANT CHANGE UP
RH IG SCN BLD-IMP: NEGATIVE — SIGNIFICANT CHANGE UP

## 2019-06-11 RX ORDER — FAMOTIDINE 10 MG/ML
20 INJECTION INTRAVENOUS ONCE
Refills: 0 | Status: DISCONTINUED | OUTPATIENT
Start: 2019-06-11 | End: 2019-06-11

## 2019-06-11 RX ORDER — SODIUM CHLORIDE 9 MG/ML
1000 INJECTION, SOLUTION INTRAVENOUS ONCE
Refills: 0 | Status: DISCONTINUED | OUTPATIENT
Start: 2019-06-11 | End: 2019-06-11

## 2019-06-11 RX ORDER — SODIUM CHLORIDE 9 MG/ML
1000 INJECTION, SOLUTION INTRAVENOUS
Refills: 0 | Status: DISCONTINUED | OUTPATIENT
Start: 2019-06-11 | End: 2019-06-11

## 2019-06-11 RX ORDER — METOCLOPRAMIDE HCL 10 MG
10 TABLET ORAL ONCE
Refills: 0 | Status: DISCONTINUED | OUTPATIENT
Start: 2019-06-11 | End: 2019-06-11

## 2019-06-11 RX ORDER — CITRIC ACID/SODIUM CITRATE 300-500 MG
30 SOLUTION, ORAL ORAL ONCE
Refills: 0 | Status: DISCONTINUED | OUTPATIENT
Start: 2019-06-11 | End: 2019-06-11

## 2019-06-11 NOTE — OB PROVIDER H&P - HISTORY OF PRESENT ILLNESS
34yo female  EDC 19 presents@ 37.3wks for scheduled cerclage removal. Cerclage placed 19  +AP course otherwise unremarkable.   Denies VB,ROM or UCs today.  +FM

## 2019-06-11 NOTE — OB PROVIDER H&P - ASSESSMENT
Admit to L&D  mary grace cerclage removal  NPO  routine labs  EFM/TOCO  anesthesia consult  Gina Hammond PAC  06-11-19 @ 13:01

## 2019-06-11 NOTE — OB PROVIDER H&P - NSHPPHYSICALEXAM_GEN_ALL_CORE
T(C): 36.6 (06-11-19 @ 11:56), Max: 36.6 (06-11-19 @ 11:56)    RR: 20 (06-11-19 @ 11:56) (20 - 20)    Heart: RRR, S1&S2, no S3  Lungs: Clear bilateral to auscultation, good inspiratory /expiratory effort              no rhonchi, no rales  Abd: Gravid  EFM:  110 bpm/moderate variabilty/+accelerations/no decelerations/CAT 1  TOCO:  no UC  Ext: FROM, minimal edema

## 2019-06-11 NOTE — CHART NOTE - NSCHARTNOTEFT_GEN_A_CORE
PACU PA NOTE  Attending rescheduled cerclage removal 6/12/19 in am due to emergency in L&D.  Pt was advised and given instruction to return to hospital in am. NPO after midnight. Clear fluids in am until 2 hrs prior to hospital arrival  NST performed - 110bpm/mod variability/no decels/good accelarations/CAT 1  Gina Hammond PAC

## 2019-06-11 NOTE — DISCHARGE NOTE ANTEPARTUM - MEDICATION SUMMARY - MEDICATIONS TO TAKE
I will START or STAY ON the medications listed below when I get home from the hospital:    Prenatal 1 oral capsule  -- Indication: For Maternal care for cervical incompetence

## 2019-06-11 NOTE — DISCHARGE NOTE ANTEPARTUM - HOSPITAL COURSE
34yo  [facility] 19 present @ 37.3wks for scheduled cerclage removal.  Due to unforeseen emergency in Labor and Delivery , attending rescheduled removal for 19.  NST was performed  Pt was discharged in stable condition with instructions to return to hospital in am @ 8am.  Pt expressed understanding and discharged in stable condition  Gina Hammond PAC

## 2019-06-11 NOTE — DISCHARGE NOTE ANTEPARTUM - PATIENT PORTAL LINK FT
You can access the [facility] Patient Portal, offered by [facility], by registering with the following website: [email or web address]

## 2019-06-11 NOTE — DISCHARGE NOTE ANTEPARTUM - CARE PROVIDER_API CALL
Rony Ybarra)  MaternalFetal Medicine; Obstetrics and Gynecology  [address] [address] [place] [address], Room [address]  [place] [address]  Phone: [phone]  Fax: [phone]  Follow Up Time:

## 2019-06-12 ENCOUNTER — TRANSCRIPTION ENCOUNTER (OUTPATIENT)
Age: 34
End: 2019-06-12

## 2019-06-12 ENCOUNTER — OUTPATIENT (OUTPATIENT)
Dept: INPATIENT UNIT | Facility: HOSPITAL | Age: 34
LOS: 1 days | Discharge: HOME | End: 2019-06-12

## 2019-06-12 VITALS — DIASTOLIC BLOOD PRESSURE: 67 MMHG | HEART RATE: 83 BPM | SYSTOLIC BLOOD PRESSURE: 112 MMHG

## 2019-06-12 VITALS — TEMPERATURE: 98 F

## 2019-06-12 DIAGNOSIS — O34.32 MATERNAL CARE FOR CERVICAL INCOMPETENCE, SECOND TRIMESTER: ICD-10-CM

## 2019-06-12 RX ORDER — METOCLOPRAMIDE HCL 10 MG
10 TABLET ORAL ONCE
Refills: 0 | Status: DISCONTINUED | OUTPATIENT
Start: 2019-06-12 | End: 2019-06-12

## 2019-06-12 RX ORDER — CITRIC ACID/SODIUM CITRATE 300-500 MG
30 SOLUTION, ORAL ORAL ONCE
Refills: 0 | Status: DISCONTINUED | OUTPATIENT
Start: 2019-06-12 | End: 2019-06-12

## 2019-06-12 RX ORDER — FAMOTIDINE 10 MG/ML
20 INJECTION INTRAVENOUS ONCE
Refills: 0 | Status: DISCONTINUED | OUTPATIENT
Start: 2019-06-12 | End: 2019-06-12

## 2019-06-12 RX ORDER — SODIUM CHLORIDE 9 MG/ML
1000 INJECTION, SOLUTION INTRAVENOUS ONCE
Refills: 0 | Status: DISCONTINUED | OUTPATIENT
Start: 2019-06-12 | End: 2019-06-12

## 2019-06-12 RX ORDER — SODIUM CHLORIDE 9 MG/ML
1000 INJECTION, SOLUTION INTRAVENOUS
Refills: 0 | Status: DISCONTINUED | OUTPATIENT
Start: 2019-06-12 | End: 2019-06-12

## 2019-06-12 NOTE — OB PROVIDER H&P - ASSESSMENT
Admit to L&D  scheduled cerclage removal  NPO  routine labs  EFM/TOCO  Bedside TLTAS  anesthesia consult  Gina Hammond PAC  06-12-19 @ 09:56

## 2019-06-12 NOTE — OB PROVIDER H&P - NSHPPHYSICALEXAM_GEN_ALL_CORE
T(C): 36.6 (06-11-19 @ 11:56), Max: 36.6 (06-11-19 @ 11:56)  HR: 87 (06-12-19 @ 09:46) (82 - 87)  BP: 104/57 (06-12-19 @ 09:46) (104/57 - 107/69)  RR: 20 (06-11-19 @ 11:56) (20 - 20)    Heart: RRR, S1&S2, no S3  Lungs: Clear bilateral to auscultation, good inspiratory /expiratory effort              no rhonchi, no rales  Abd: Gravid  EFM:  145 bpm/moderate variabilty/+accelerations/no decelerations/CAT 1  TOCO:  no UC  Ext: FROM, minimal edema

## 2019-06-12 NOTE — DISCHARGE NOTE ANTEPARTUM - PATIENT PORTAL LINK FT
You can access the [facility] Patient Portal, offered by [facility], by registering with the following website: [email or web address]

## 2019-06-12 NOTE — OB PROVIDER H&P - NS_OBGYNHISTORY_OBGYN_ALL_OB_FT
OBHx:            2013 RADHA-Max@ 37wks           2011- PLTCS- IUGR           3/31/2008- NVD          sABx 1

## 2019-06-12 NOTE — DISCHARGE NOTE ANTEPARTUM - HOSPITAL COURSE
34yo female  EDC 19 w. cerclage in place.    Cerclage was removed successfully without anesthesia  NST- Cat1 before discharge home  f/u with provider as discussed  continue PNV  Return to hospital if any VB, UC's or decreased fetal movement  Gina Hammond PAC

## 2019-06-12 NOTE — OB PROVIDER H&P - HISTORY OF PRESENT ILLNESS
34yo female  EDC 19 presents@ 37.3wks for scheduled cerclage removal. Rescheduled from 19.Cerclage placed 19  +AP course otherwise unremarkable.   Denies VB,ROM or UCs today.  +FM

## 2019-06-12 NOTE — DISCHARGE NOTE ANTEPARTUM - CARE PLAN
Principal Discharge DX:	Incompetent cervix  Goal:	TOLAC  Assessment and plan of treatment:	incompetant cervix  suture removed without incident  Secondary Diagnosis:	Cervical cerclage suture present, antepartum

## 2019-06-12 NOTE — DISCHARGE NOTE ANTEPARTUM - CARE PROVIDER_API CALL
Rony Ybarra)  MaternalFetal Medicine; Obstetrics and Gynecology  [address] [address] [place] [address], Room [address]  [place] [address]  Phone: [phone]  Fax: [phone]  Follow Up Time:

## 2019-06-12 NOTE — DISCHARGE NOTE ANTEPARTUM - MEDICATION SUMMARY - MEDICATIONS TO TAKE
I will START or STAY ON the medications listed below when I get home from the hospital:    Prenatal 1 oral capsule  -- Indication: For Maternal care for cervical incompetence in second trimester

## 2019-06-15 ENCOUNTER — INPATIENT (INPATIENT)
Facility: HOSPITAL | Age: 34
LOS: 2 days | Discharge: HOME | End: 2019-06-18
Attending: OBSTETRICS & GYNECOLOGY | Admitting: OBSTETRICS & GYNECOLOGY
Payer: MEDICAID

## 2019-06-15 ENCOUNTER — EMERGENCY (EMERGENCY)
Facility: HOSPITAL | Age: 34
LOS: 1 days | Discharge: STILL ADMITTED OR ADMINISTRATIVE | End: 2019-06-15
Admitting: EMERGENCY MEDICINE

## 2019-06-15 VITALS
OXYGEN SATURATION: 100 % | TEMPERATURE: 98 F | SYSTOLIC BLOOD PRESSURE: 109 MMHG | RESPIRATION RATE: 18 BRPM | HEART RATE: 84 BPM | DIASTOLIC BLOOD PRESSURE: 72 MMHG

## 2019-06-15 VITALS
RESPIRATION RATE: 18 BRPM | SYSTOLIC BLOOD PRESSURE: 118 MMHG | TEMPERATURE: 99 F | HEART RATE: 93 BPM | DIASTOLIC BLOOD PRESSURE: 80 MMHG

## 2019-06-15 DIAGNOSIS — O42.92 FULL-TERM PREMATURE RUPTURE OF MEMBRANES, UNSPECIFIED AS TO LENGTH OF TIME BETWEEN RUPTURE AND ONSET OF LABOR: ICD-10-CM

## 2019-06-15 DIAGNOSIS — Z3A.00 WEEKS OF GESTATION OF PREGNANCY NOT SPECIFIED: ICD-10-CM

## 2019-06-15 DIAGNOSIS — O26.899 OTHER SPECIFIED PREGNANCY RELATED CONDITIONS, UNSPECIFIED TRIMESTER: ICD-10-CM

## 2019-06-15 LAB
ANTIBODY ID 1_1: SIGNIFICANT CHANGE UP
BASOPHILS # BLD AUTO: 0.02 K/UL — SIGNIFICANT CHANGE UP (ref 0–0.2)
BASOPHILS NFR BLD AUTO: 0.2 % — SIGNIFICANT CHANGE UP (ref 0–2)
BLD GP AB SCN SERPL QL: POSITIVE — SIGNIFICANT CHANGE UP
DAT POLY-SP REAG RBC QL: NEGATIVE — SIGNIFICANT CHANGE UP
EOSINOPHIL # BLD AUTO: 0.02 K/UL — SIGNIFICANT CHANGE UP (ref 0–0.5)
EOSINOPHIL NFR BLD AUTO: 0.2 % — SIGNIFICANT CHANGE UP (ref 0–6)
HCT VFR BLD CALC: 33.8 % — LOW (ref 34.5–45)
HGB BLD-MCNC: 11.2 G/DL — LOW (ref 11.5–15.5)
IMM GRANULOCYTES NFR BLD AUTO: 0.5 % — SIGNIFICANT CHANGE UP (ref 0–1.5)
LYMPHOCYTES # BLD AUTO: 1.8 K/UL — SIGNIFICANT CHANGE UP (ref 1–3.3)
LYMPHOCYTES # BLD AUTO: 22.2 % — SIGNIFICANT CHANGE UP (ref 13–44)
MCHC RBC-ENTMCNC: 28 PG — SIGNIFICANT CHANGE UP (ref 27–34)
MCHC RBC-ENTMCNC: 33.1 % — SIGNIFICANT CHANGE UP (ref 32–36)
MCV RBC AUTO: 84.5 FL — SIGNIFICANT CHANGE UP (ref 80–100)
MONOCYTES # BLD AUTO: 0.66 K/UL — SIGNIFICANT CHANGE UP (ref 0–0.9)
MONOCYTES NFR BLD AUTO: 8.2 % — SIGNIFICANT CHANGE UP (ref 2–14)
NEUTROPHILS # BLD AUTO: 5.55 K/UL — SIGNIFICANT CHANGE UP (ref 1.8–7.4)
NEUTROPHILS NFR BLD AUTO: 68.7 % — SIGNIFICANT CHANGE UP (ref 43–77)
NRBC # FLD: 0 K/UL — SIGNIFICANT CHANGE UP (ref 0–0)
PLATELET # BLD AUTO: 242 K/UL — SIGNIFICANT CHANGE UP (ref 150–400)
PMV BLD: 10.9 FL — SIGNIFICANT CHANGE UP (ref 7–13)
RBC # BLD: 4 M/UL — SIGNIFICANT CHANGE UP (ref 3.8–5.2)
RBC # FLD: 12.9 % — SIGNIFICANT CHANGE UP (ref 10.3–14.5)
RH IG SCN BLD-IMP: NEGATIVE — SIGNIFICANT CHANGE UP
WBC # BLD: 8.09 K/UL — SIGNIFICANT CHANGE UP (ref 3.8–10.5)
WBC # FLD AUTO: 8.09 K/UL — SIGNIFICANT CHANGE UP (ref 3.8–10.5)

## 2019-06-15 RX ORDER — DIPHENHYDRAMINE HCL 50 MG
25 CAPSULE ORAL ONCE
Refills: 0 | Status: COMPLETED | OUTPATIENT
Start: 2019-06-15 | End: 2019-06-15

## 2019-06-15 RX ORDER — SODIUM CHLORIDE 9 MG/ML
1000 INJECTION, SOLUTION INTRAVENOUS
Refills: 0 | Status: DISCONTINUED | OUTPATIENT
Start: 2019-06-15 | End: 2019-06-16

## 2019-06-15 RX ORDER — OXYTOCIN 10 UNIT/ML
333.33 VIAL (ML) INJECTION
Qty: 20 | Refills: 0 | Status: DISCONTINUED | OUTPATIENT
Start: 2019-06-15 | End: 2019-06-17

## 2019-06-15 RX ORDER — CITRIC ACID/SODIUM CITRATE 300-500 MG
15 SOLUTION, ORAL ORAL EVERY 6 HOURS
Refills: 0 | Status: DISCONTINUED | OUTPATIENT
Start: 2019-06-15 | End: 2019-06-16

## 2019-06-15 RX ADMIN — Medication 25 MILLIGRAM(S): at 23:04

## 2019-06-15 RX ADMIN — SODIUM CHLORIDE 125 MILLILITER(S): 9 INJECTION, SOLUTION INTRAVENOUS at 17:49

## 2019-06-15 NOTE — CHART NOTE - NSCHARTNOTEFT_GEN_A_CORE
Decel to 60s lasting 2mins after contraction  Pt evaluated at bedside    VE 3/70/-3  FHT resolved spontaneously     34yo P4 at 38w 2 prior C/S admitted with SROM, in latent labor. Pt desires TOLAC  Fetus Category 2 with prolonged decel x1, but overall reassuring with moderate variability and accelerations.  Intrauterine resuscitation initiated - lateral positioning, O2 face mask, IVF hydration  - monitor EFM/toco  - for epidural, anesthesiologist notified  - VE in 2hrs. If no cervical change, will begin pitocin augmentation    D/w Dr. Warner  S Ewguzman, R3

## 2019-06-15 NOTE — CHART NOTE - NSCHARTNOTEFT_GEN_A_CORE
Patient evaluated at bedside.  She is comfortable with epidural    VE 3-4/70/-3    EFM 130bpm, moderate variability, +Accels, intermittent decels  irreg contractions q2-5mins    Pt is making cervical change.   Fetus Category 2 tracing with intermittent variable decels and late x2 spontaneously recovered.  Will continue expectant management at this time    - continue intrauterine resuscitation  - will re-examine, and augment as indicated    D/w Dr. Warner  S Ewumi, R3

## 2019-06-15 NOTE — OB PROVIDER TRIAGE NOTE - NSOBPROVIDERNOTE_OBGYN_ALL_OB_FT
34 yo , EGA@38 weeks, PPROM, not in labor.  History of 2  sections; Patient desires to TOLAC.  Cerclage was removed on 2019.  Upon admission: SVE: /-2, clear fluid, category 1 FHTs, no contractions; EFW 3600 g  Case was reviewed with Dr Ybarra-->was advised to allow TOLAC if spontaneous labor; not a candidate for IOL  Plan of care will consult with Dr Barreto.

## 2019-06-15 NOTE — OB PROVIDER H&P - NS_OBGYNHISTORY_OBGYN_ALL_OB_FT
2008, , full term, [id number], cerclage for cervical insufficiency  ,  section, 34 weeks, PPROM, 3lbs/4lbs; cerclage for cervical insufficiency  , repeat  section, full term, [id number]; cerclage for cervical insufficiency  , SAB, 6 weeks, D&C

## 2019-06-15 NOTE — OB RN PATIENT PROFILE - ALERT: PERTINENT HISTORY
Ultra Screen at 12 Weeks/1st Trimester Sonogram/Fetal Non-Stress Test (NST)/20 Week Level II Sonogram

## 2019-06-15 NOTE — OB PROVIDER H&P - PROBLEM SELECTOR PLAN 1
admit for expectant management 34 yo , EGA@38 weeks, PPROM, not in labor.  History of 2  sections; Patient desires to TOLAC.   TOLAC consent was signed and in the chart.  Cerclage was removed on 2019.  Upon admission: SVE: /-2, clear fluid, category 1 FHTs, no contractions; EFW 3600 g  Case was reviewed with Dr Ybarra-->was advised to allow TOLAC if spontaneous labor; not a candidate for IOL  Plan of care will consult with Dr Barreto.

## 2019-06-15 NOTE — OB PROVIDER H&P - ALERT: PERTINENT HISTORY
20 Week Level II Sonogram/Fetal Non-Stress Test (NST)/1st Trimester Sonogram/Ultra Screen at 12 Weeks

## 2019-06-15 NOTE — ED ADULT TRIAGE NOTE - CHIEF COMPLAINT QUOTE
38 weeks pregnant 5th child pts water broke no vag bleed or abd pain l and called and todl to send pt up

## 2019-06-15 NOTE — CHART NOTE - NSCHARTNOTEFT_GEN_A_CORE
R3 Progress Note    Pt evaluated for a 2 min prolonged decel that recovered with LL tilt, O2.  SVE 4/80/-2. EFM: 140 bpm/mod jenni/+accel/ intermittent decels/variables. Bernice: irreg    Vital Signs Last 24 Hrs  T(C): 36.8 (15 Bay 2019 23:44), Max: 37 (15 Aby 2019 15:01)  T(F): 98.24 (15 Bay 2019 23:44), Max: 98.6 (15 Bay 2019 15:01)  HR: 68 (15 Bay 2019 23:55) (64 - 100)  BP: 114/69 (15 Bay 2019 23:41) (108/69 - 141/88)  BP(mean): --  RR: 16 (15 Bay 2019 17:32) (16 - 18)  SpO2: 100% (15 Bay 2019 23:55) (90% - 100%)    I&O's Detail    15 Bay 2019 07:01  -  15 Bay 2019 23:59  --------------------------------------------------------  IN:    lactated ringers.: 1375 mL  Total IN: 1375 mL    OUT:  Total OUT: 0 mL    Total NET: 1375 mL                                11.2   8.09  )-----------( 242      ( 15 Bay 2019 16:15 )             33.8           Plan  Cat2 tracing, resuscitate with lateral tilt, O2, IVF bolus  Discussed possible c/s due to cat 2 FHT if it continues. Pt upset and currently refusing c/s.   epidural in place, working.    D/w Dr. Warner, en route to  pt re: repeat c/s  JIMMY Carrillo PGY2

## 2019-06-15 NOTE — OB PROVIDER TRIAGE NOTE - NS_OBGYNHISTORY_OBGYN_ALL_OB_FT
2008, , full term, [id number], cerclage for cervical insufficiency  ,  section, 34 weeks, PPROM, 3lbs/4lbs; cerclage for cervical insufficiency  , repeat  section, full term, [id number]; cerclage for cervical insufficiency  , SAB, 6 weeks, D&C

## 2019-06-16 DIAGNOSIS — O42.10 PREMATURE RUPTURE OF MEMBRANES, ONSET OF LABOR MORE THAN 24 HOURS FOLLOWING RUPTURE, UNSPECIFIED WEEKS OF GESTATION: ICD-10-CM

## 2019-06-16 LAB — T PALLIDUM AB TITR SER: NEGATIVE — SIGNIFICANT CHANGE UP

## 2019-06-16 PROCEDURE — 86077 PHYS BLOOD BANK SERV XMATCH: CPT

## 2019-06-16 RX ORDER — ONDANSETRON 8 MG/1
4 TABLET, FILM COATED ORAL ONCE
Refills: 0 | Status: COMPLETED | OUTPATIENT
Start: 2019-06-16 | End: 2019-06-16

## 2019-06-16 RX ORDER — HYDROCORTISONE 1 %
1 OINTMENT (GRAM) TOPICAL EVERY 6 HOURS
Refills: 0 | Status: DISCONTINUED | OUTPATIENT
Start: 2019-06-16 | End: 2019-06-18

## 2019-06-16 RX ORDER — GLYCERIN ADULT
1 SUPPOSITORY, RECTAL RECTAL AT BEDTIME
Refills: 0 | Status: DISCONTINUED | OUTPATIENT
Start: 2019-06-16 | End: 2019-06-18

## 2019-06-16 RX ORDER — ACETAMINOPHEN 500 MG
975 TABLET ORAL EVERY 6 HOURS
Refills: 0 | Status: DISCONTINUED | OUTPATIENT
Start: 2019-06-16 | End: 2019-06-18

## 2019-06-16 RX ORDER — IBUPROFEN 200 MG
600 TABLET ORAL EVERY 6 HOURS
Refills: 0 | Status: COMPLETED | OUTPATIENT
Start: 2019-06-16 | End: 2020-05-14

## 2019-06-16 RX ORDER — KETOROLAC TROMETHAMINE 30 MG/ML
30 SYRINGE (ML) INJECTION ONCE
Refills: 0 | Status: DISCONTINUED | OUTPATIENT
Start: 2019-06-16 | End: 2019-06-16

## 2019-06-16 RX ORDER — MAGNESIUM HYDROXIDE 400 MG/1
30 TABLET, CHEWABLE ORAL
Refills: 0 | Status: DISCONTINUED | OUTPATIENT
Start: 2019-06-16 | End: 2019-06-18

## 2019-06-16 RX ORDER — OXYCODONE HYDROCHLORIDE 5 MG/1
5 TABLET ORAL ONCE
Refills: 0 | Status: DISCONTINUED | OUTPATIENT
Start: 2019-06-16 | End: 2019-06-18

## 2019-06-16 RX ORDER — GENTAMICIN SULFATE 40 MG/ML
280 VIAL (ML) INJECTION ONCE
Refills: 0 | Status: COMPLETED | OUTPATIENT
Start: 2019-06-16 | End: 2019-06-16

## 2019-06-16 RX ORDER — DIPHENHYDRAMINE HCL 50 MG
25 CAPSULE ORAL EVERY 6 HOURS
Refills: 0 | Status: DISCONTINUED | OUTPATIENT
Start: 2019-06-16 | End: 2019-06-18

## 2019-06-16 RX ORDER — BENZOCAINE 10 %
1 GEL (GRAM) MUCOUS MEMBRANE EVERY 6 HOURS
Refills: 0 | Status: DISCONTINUED | OUTPATIENT
Start: 2019-06-16 | End: 2019-06-18

## 2019-06-16 RX ORDER — ACETAMINOPHEN 500 MG
975 TABLET ORAL ONCE
Refills: 0 | Status: COMPLETED | OUTPATIENT
Start: 2019-06-16 | End: 2019-06-16

## 2019-06-16 RX ORDER — TETANUS TOXOID, REDUCED DIPHTHERIA TOXOID AND ACELLULAR PERTUSSIS VACCINE, ADSORBED 5; 2.5; 8; 8; 2.5 [IU]/.5ML; [IU]/.5ML; UG/.5ML; UG/.5ML; UG/.5ML
0.5 SUSPENSION INTRAMUSCULAR ONCE
Refills: 0 | Status: DISCONTINUED | OUTPATIENT
Start: 2019-06-16 | End: 2019-06-18

## 2019-06-16 RX ORDER — PRAMOXINE HYDROCHLORIDE 150 MG/15G
1 AEROSOL, FOAM RECTAL EVERY 4 HOURS
Refills: 0 | Status: DISCONTINUED | OUTPATIENT
Start: 2019-06-16 | End: 2019-06-18

## 2019-06-16 RX ORDER — SODIUM CHLORIDE 9 MG/ML
3 INJECTION INTRAMUSCULAR; INTRAVENOUS; SUBCUTANEOUS EVERY 8 HOURS
Refills: 0 | Status: DISCONTINUED | OUTPATIENT
Start: 2019-06-16 | End: 2019-06-18

## 2019-06-16 RX ORDER — SIMETHICONE 80 MG/1
80 TABLET, CHEWABLE ORAL EVERY 4 HOURS
Refills: 0 | Status: DISCONTINUED | OUTPATIENT
Start: 2019-06-16 | End: 2019-06-18

## 2019-06-16 RX ORDER — OXYTOCIN 10 UNIT/ML
333.33 VIAL (ML) INJECTION
Qty: 20 | Refills: 0 | Status: DISCONTINUED | OUTPATIENT
Start: 2019-06-16 | End: 2019-06-17

## 2019-06-16 RX ORDER — AMPICILLIN TRIHYDRATE 250 MG
CAPSULE ORAL
Refills: 0 | Status: DISCONTINUED | OUTPATIENT
Start: 2019-06-16 | End: 2019-06-16

## 2019-06-16 RX ORDER — OXYCODONE HYDROCHLORIDE 5 MG/1
5 TABLET ORAL
Refills: 0 | Status: DISCONTINUED | OUTPATIENT
Start: 2019-06-16 | End: 2019-06-18

## 2019-06-16 RX ORDER — LANOLIN
1 OINTMENT (GRAM) TOPICAL EVERY 6 HOURS
Refills: 0 | Status: DISCONTINUED | OUTPATIENT
Start: 2019-06-16 | End: 2019-06-18

## 2019-06-16 RX ORDER — AMPICILLIN TRIHYDRATE 250 MG
2 CAPSULE ORAL EVERY 6 HOURS
Refills: 0 | Status: DISCONTINUED | OUTPATIENT
Start: 2019-06-16 | End: 2019-06-16

## 2019-06-16 RX ORDER — AMPICILLIN TRIHYDRATE 250 MG
2 CAPSULE ORAL ONCE
Refills: 0 | Status: COMPLETED | OUTPATIENT
Start: 2019-06-16 | End: 2019-06-16

## 2019-06-16 RX ORDER — DIBUCAINE 1 %
1 OINTMENT (GRAM) RECTAL EVERY 6 HOURS
Refills: 0 | Status: DISCONTINUED | OUTPATIENT
Start: 2019-06-16 | End: 2019-06-18

## 2019-06-16 RX ORDER — AER TRAVELER 0.5 G/1
1 SOLUTION RECTAL; TOPICAL EVERY 4 HOURS
Refills: 0 | Status: DISCONTINUED | OUTPATIENT
Start: 2019-06-16 | End: 2019-06-18

## 2019-06-16 RX ORDER — IBUPROFEN 200 MG
600 TABLET ORAL EVERY 6 HOURS
Refills: 0 | Status: DISCONTINUED | OUTPATIENT
Start: 2019-06-16 | End: 2019-06-18

## 2019-06-16 RX ORDER — DOCUSATE SODIUM 100 MG
100 CAPSULE ORAL
Refills: 0 | Status: DISCONTINUED | OUTPATIENT
Start: 2019-06-16 | End: 2019-06-18

## 2019-06-16 RX ADMIN — Medication 600 MILLIGRAM(S): at 17:55

## 2019-06-16 RX ADMIN — Medication 1000 MILLIUNIT(S)/MIN: at 09:31

## 2019-06-16 RX ADMIN — Medication 216 GRAM(S): at 14:14

## 2019-06-16 RX ADMIN — Medication 216 GRAM(S): at 06:13

## 2019-06-16 RX ADMIN — Medication 975 MILLIGRAM(S): at 07:00

## 2019-06-16 RX ADMIN — Medication 975 MILLIGRAM(S): at 06:11

## 2019-06-16 RX ADMIN — Medication 975 MILLIGRAM(S): at 21:50

## 2019-06-16 RX ADMIN — Medication 975 MILLIGRAM(S): at 21:17

## 2019-06-16 RX ADMIN — Medication 600 MILLIGRAM(S): at 17:25

## 2019-06-16 RX ADMIN — SODIUM CHLORIDE 3 MILLILITER(S): 9 INJECTION INTRAMUSCULAR; INTRAVENOUS; SUBCUTANEOUS at 14:09

## 2019-06-16 RX ADMIN — Medication 250 MILLIGRAM(S): at 07:42

## 2019-06-16 RX ADMIN — Medication 30 MILLIGRAM(S): at 09:00

## 2019-06-16 NOTE — CHART NOTE - NSCHARTNOTEFT_GEN_A_CORE
R3 Labor Note    Pt re-evaluated for cervical change. Pt comfortable with epidural    Vital Signs Last 24 Hrs  T(C): 37.2 (16 Jun 2019 01:30), Max: 37.2 (16 Jun 2019 01:30)  T(F): 98.96 (16 Jun 2019 01:30), Max: 98.96 (16 Jun 2019 01:30)  HR: 85 (16 Jun 2019 01:40) (64 - 100)  BP: 126/76 (16 Jun 2019 01:40) (108/69 - 141/88)  BP(mean): --  RR: 16 (15 [place] 2019 17:32) (16 - 18)  SpO2: 100% (16 Jun 2019 01:40) (90% - 100%)    I&O's Detail    15 [place] 2019 07:01  -  16 Jun 2019 01:46  --------------------------------------------------------  IN:    lactated ringers.: 1875 mL  Total IN: 1875 mL    OUT:  Total OUT: 0 mL    Total NET: 1875 mL                                11.2   8.09  )-----------( 242      ( 15 [place] 2019 16:15 )             33.8           SVE: 6/90/-2  EFM: 140 bpm/mod jenni/+accel/-decel  [place]: q2-3 bpm    Plan  Cat1 tracing.   Cont exp mgmt, place on peanut ball  Epi in place, working    Dr. Warner in route  JIMMY Carrillo PGY2

## 2019-06-16 NOTE — OB PROVIDER DELIVERY SUMMARY - NSPROVIDERDELIVERYNOTE_OBGYN_ALL_OB_FT
Pt fully dilated and pushing.  of viable male infant. Delayed cord clamping x 30 seconds. Peds in attendance for maternal fever. Cord gases sent. Placenta delivered spontaneously intact. Second degree laceration repaired with 2-0 chromic. Sponge, needle counts correct. Vaginal vault clear. No defect palpated in lower uterine segment.

## 2019-06-16 NOTE — CHART NOTE - NSCHARTNOTEFT_GEN_A_CORE
R4    Extensive discussion with patient regarding variables noted on EFM and lack of cervical change for 3+ hours. Patient advised that with 2 prior C/S the risk of uterine rupture is significant especially considering variable decels on tracing. Would not give patient pitocin with Cat 2 tracing and patient has been unchanged for several hours now. Cervix increasingly edematous. Vaginal delivery increasingly unlikely. Concerned for maternal and fetal well being. This was discussed with patient at length.     Pt desires to speak with Dr Warner    After discussion patient still refusing  despite recommendation.   IUPC and amnioinfusion placed by Dr Warner    Will continue expectant management and close monitoring    [email or web address]

## 2019-06-17 ENCOUNTER — TRANSCRIPTION ENCOUNTER (OUTPATIENT)
Age: 34
End: 2019-06-17

## 2019-06-17 DIAGNOSIS — O34.219 MATERNAL CARE FOR UNSPECIFIED TYPE SCAR FROM PREVIOUS CESAREAN DELIVERY: ICD-10-CM

## 2019-06-17 LAB — RBC # BLD FETUS AUTO: 0 — SIGNIFICANT CHANGE UP

## 2019-06-17 RX ADMIN — Medication 600 MILLIGRAM(S): at 23:22

## 2019-06-17 RX ADMIN — SIMETHICONE 80 MILLIGRAM(S): 80 TABLET, CHEWABLE ORAL at 20:27

## 2019-06-17 RX ADMIN — SODIUM CHLORIDE 3 MILLILITER(S): 9 INJECTION INTRAMUSCULAR; INTRAVENOUS; SUBCUTANEOUS at 04:55

## 2019-06-17 RX ADMIN — Medication 600 MILLIGRAM(S): at 03:11

## 2019-06-17 RX ADMIN — Medication 600 MILLIGRAM(S): at 03:50

## 2019-06-17 RX ADMIN — Medication 975 MILLIGRAM(S): at 11:24

## 2019-06-17 RX ADMIN — Medication 975 MILLIGRAM(S): at 12:30

## 2019-06-17 RX ADMIN — Medication 1 TABLET(S): at 11:25

## 2019-06-18 VITALS
HEART RATE: 83 BPM | TEMPERATURE: 98 F | DIASTOLIC BLOOD PRESSURE: 83 MMHG | SYSTOLIC BLOOD PRESSURE: 115 MMHG | OXYGEN SATURATION: 99 % | RESPIRATION RATE: 17 BRPM

## 2019-06-18 RX ORDER — IBUPROFEN 200 MG
1 TABLET ORAL
Qty: 0 | Refills: 0 | DISCHARGE
Start: 2019-06-18

## 2019-06-18 RX ORDER — ACETAMINOPHEN 500 MG
3 TABLET ORAL
Qty: 0 | Refills: 0 | DISCHARGE
Start: 2019-06-18

## 2019-06-18 RX ADMIN — Medication 600 MILLIGRAM(S): at 14:22

## 2019-06-18 RX ADMIN — Medication 600 MILLIGRAM(S): at 00:00

## 2019-06-18 NOTE — DISCHARGE NOTE OB - MATERIALS PROVIDED
Guide to Postpartum Care/Birth Certificate Instructions/Vaccinations/[facility] Hearing Screen Program/[facility] [place] Screening Program/Shaken Baby Prevention Handout/[place]  Immunization Record

## 2019-06-18 NOTE — DISCHARGE NOTE OB - CARE PLAN
Principal Discharge DX:	 (vaginal birth after )  Goal:	return to baseline activities over next 6 weeks  Assessment and plan of treatment:	please call office to make appointment

## 2019-06-18 NOTE — PROGRESS NOTE ADULT - ASSESSMENT
A/P: 34yo PPD#1 s/p .  Patient is stable and doing well post-partum.
A/P: 32yo PPD#2 s/p . Patient is stable and doing well post-partum.

## 2019-06-18 NOTE — PROGRESS NOTE ADULT - PROBLEM SELECTOR PLAN 1
- Pain well controlled, continue current pain regimen  - Increase ambulation as tolerated  - Continue regular diet  - Discharge planning    Ayla Diaz, PGY-1  Pager# [phone]
- Pain well controlled, continue current pain regimen  - Increase ambulation, SCDs when not ambulating  - Continue regular diet  -Breast Feeding    Trent Baca PGY-1

## 2019-06-18 NOTE — DISCHARGE NOTE OB - CARE PROVIDER_API CALL
Rony Ybarra)  MaternalFetal Medicine; Obstetrics and Gynecology  [address] [address] [place] [address], Room [address]  [place] [address]  Phone: [phone]  Fax: [phone]  Follow Up Time:

## 2019-06-18 NOTE — DISCHARGE NOTE OB - PATIENT PORTAL LINK FT
You can access the [facility], offered by [facility], by registering with the following website: [email or web address]

## 2019-06-18 NOTE — DISCHARGE NOTE OB - MEDICATION SUMMARY - MEDICATIONS TO TAKE
I will START or STAY ON the medications listed below when I get home from the hospital:    acetaminophen 325 mg oral tablet  -- 3 tab(s) by mouth every 6 hours  -- Indication: For Pain, as needed    ibuprofen 600 mg oral tablet  -- 1 tab(s) by mouth every 6 hours  -- Indication: For Pain, as needed

## 2019-06-18 NOTE — PROGRESS NOTE ADULT - SUBJECTIVE AND OBJECTIVE BOX
OB Progress Note:  PPD#1    S: 32yo  PPD#1 s/p . Patient feels well. Pain is well controlled. She is tolerating a regular diet and passing flatus. She is voiding spontaneously, and ambulating without difficulty. Denies CP/SOB. Denies lightheadedness/dizziness. Denies N/V. Denies heavy vaginal bleeding.    O:  Vitals:  Vital Signs Last 24 Hrs  T(C): 36.7 (2019 05:37), Max: 37.9 (2019 08:20)  T(F): 98.1 (2019 05:37), Max: 100.2 (2019 08:20)  HR: 91 (2019 05:37) (73 - 98)  BP: 106/77 (2019 05:37) (99/75 - 122/78)  BP(mean): --  RR: 18 (2019 05:37) (18 - 18)  SpO2: 99% (2019 05:37) (91% - 100%)    MEDICATIONS  (STANDING):  acetaminophen   Tablet .. 975 milliGRAM(s) Oral every 6 hours  diphtheria/tetanus/pertussis (acellular) Vaccine (ADAcel) 0.5 milliLiter(s) IntraMuscular once  ibuprofen  Tablet. 600 milliGRAM(s) Oral every 6 hours  prenatal multivitamin 1 Tablet(s) Oral daily  sodium chloride 0.9% lock flush 3 milliLiter(s) IV Push every 8 hours      Labs:  Blood type: O Negative  Rubella IgG: RPR: Negative                          11.2<L>   8.09 >-----------< 242    ( 15 @ 16:15 )             33.8<L>                  Physical Exam:  General: NAD  Abdomen: soft, non-tender, non-distended, fundus firm  Vaginal: Lochia wnl  Extremities: No erythema/edema
POD #1 S/P .     Patient is doing well. OOBAA. Patient denies N/V/Dizziness/HA. Patient denies numbness/tingliness/pain to LE. No residual anesthetic issues or complication noted. V/S wnl as per flowsheet.
pt and  counseled by me regarding my concern for current variable decelerations and arrest of dilation. counseled for  section at this time and risks of proceeding with TOLAC including an emergency csection with increased complications for mother and risk of fetal and maternal death in the event of uterine rupture and emergent  section. pt elects at this time to continue with TOLAC and declines a  section despite stating that she understands the above risks to her and her baby.
OB Progress Note:  PPD#2    S: 32yo PPD#1 s/p . Patient feels well. Pain is well controlled. She is tolerating a regular diet, voiding spontaneously, and ambulating without difficulty. Denies CP/SOB. Denies lightheadedness/dizziness. Denies N/V.     O:  Vitals:  Vital Signs Last 24 Hrs  T(C): 36.7 (2019 05:38), Max: 36.7 (2019 18:06)  T(F): 98.1 (2019 05:38), Max: 98.1 (2019 18:06)  HR: 83 (2019 05:38) (83 - 98)  BP: 115/83 (2019 05:38) (115/83 - 121/80)  BP(mean): --  RR: 17 (2019 05:38) (17 - 17)  SpO2: 99% (2019 05:38) (99% - 100%)    MEDICATIONS  (STANDING):  acetaminophen   Tablet .. 975 milliGRAM(s) Oral every 6 hours  diphtheria/tetanus/pertussis (acellular) Vaccine (ADAcel) 0.5 milliLiter(s) IntraMuscular once  ibuprofen  Tablet. 600 milliGRAM(s) Oral every 6 hours  prenatal multivitamin 1 Tablet(s) Oral daily  sodium chloride 0.9% lock flush 3 milliLiter(s) IV Push every 8 hours      Labs:  Blood type: O Negative  Rubella IgG: RPR: Negative                          11.2<L>   8.09 >-----------< 242    ( 15 @ 16:15 )             33.8<L>      Physical Exam:  General: NAD  Abdomen: soft, non-tender, non-distended, fundus firm  Vaginal: Lochia wnl  Extremities: No erythema/edema

## 2019-06-24 NOTE — OB PROVIDER TRIAGE NOTE - NS_CHIEFCOMPLAINT_OBGYN_ALL_OB
Possible Labor Implemented All Universal Safety Interventions:  [place] to call system. Call bell, personal items and telephone within reach. Instruct patient to call for assistance. Room bathroom lighting operational. Non-slip footwear when patient is off stretcher. Physically safe environment: no spills, clutter or unnecessary equipment. Stretcher in lowest position, wheels locked, appropriate side rails in place.

## 2019-07-17 NOTE — OB RN PATIENT PROFILE - BREASTFEEDING IS BETTER ESTABLISHED WHEN INFANTS ARE ROOMING IN WITH PARENT (23/24 HOURS OF THE DAY)
Patient Education   Patient Education     Birth Control Choices  Birth control keeps you from getting pregnant during sex. There are many types of birth control. Some are more effective than others. New types are being tested all the time. Your healthcare provider can help you decide which type of birth control is best for you. But no matter which type you choose, you and your partner must use it the right way each time you have sex. Some of the most common types are described below.  Condom  A condom is a thin covering that fits over the penis. (The female condom fits inside the vagina.) A condom catches sperm that come out of the penis during sex.  Spermicide  Spermicide is a gel, foam, cream, tablet, or sponge (although the sponge has barrier properties in addition to spermicidal properties). It is put in the vagina before sex to kill sperm.  Diaphragm and cervical cap  Diaphragms and cervical caps are round rubber cups that keep sperm out of the uterus. They also hold spermicide in place.  Intrauterine device (IUD)  An IUD is a small device that is placed in the uterus by a healthcare provider to prevent pregnancy.  The pill  The birth control pill is taken daily. It contains hormones that stop a woman’s body from releasing an egg each month.  Other hormones  Hormones that stop a woman’s egg from being released each month can be delivered in other ways. These include injection, implant, patch, or vaginal ring.  Other choices  Here are additional birth control methods:  · Male sterilization (vasectomy) is surgery that ties off or cuts the tubes called the vas deferens in the testes. This is done so sperm cannot come out when the man ejaculates.  · Female sterilization is surgery to block or cut the woman's fallopian tubes. It can be done by placing an instrument into the uterus (hysteroscopy) to insert small coils into the fallopian tubes (Essure). It can also be done through the belly (laparoscopy) to block the  tubes or remove part or all of the tubes.  · Withdrawal method is when the male doesn't ejaculate into the vagina, but rather withdraws his penis just before he ejaculates.  · Fertility awareness method is when a woman keeps track of her fertile days. She only has intercourse at times when she is not likely to get pregnant.  Emergency contraception (EC)  Emergency contraception can help prevent pregnancy after unprotected sex. Hormone pills (“morning after pills”) are available over the counter to anyone. A second type of EC, a copper IUD, needs to be inserted by a trained healthcare provider. Either type of EC can be used up to 5 days after sex, but it should be taken as soon as possible. The sooner it is used after unprotected sex, the more likely it is to be effective. EC will not work if you’re already pregnant.  Things to consider  Think about the following:  · Choose a type of birth control that is easy for you to use.  · Read the package and follow your health care provider's instructions to learn to use your birth control the right way.  · Most forms of birth control do not protect you from sexually transmitted infections (STIs). To protect against STIs, always use a latex condom. If you are allergic to latex, a nonlatex condom may provide some protection.   Date Last Reviewed: 12/1/2016 © 2000-2018 The [employer]. [address] [place] [address], [place] [address]. All rights reserved. This information is not intended as a substitute for professional medical care. Always follow your healthcare professional's instructions.           Prevention Guidelines, Women Ages 18 to 39  Screening tests and vaccines are an important part of managing your health. A screening test is done to find possible disorders or diseases in people who don't have any symptoms. The goal is to find a disease early so lifestyle changes can be made and you can be watched more closely to reduce the risk of disease, or to detect it  early enough to treat it most effectively. Screening tests are not considered diagnostic, but are used to determine if more testing is needed. Health counseling is essential, too. Below are guidelines for these, for women ages 18 to 39. Talk with your healthcare provider to make sure you’re up-to-date on what you need.  Screening Who needs it How often   Alcohol misuse All women in this age group At routine exams   Blood pressure All women in this age group Yearly checkup if your blood pressure is normal  Normal blood pressure is less than 120/80 mm Hg  If your blood pressure reading is higher than normal, follow the advice of your healthcare provider   Breast cancer All women in this age group should talk with their healthcare providers about the need for clinical breast exams (CBE)1 Clinical breast exam every 3 years1   Cervical cancer Women ages 21 and older Women between ages 21 and 29 should have a Pap test every 3 years; women between ages 30 and 65 are advised to have a Pap test plus an HPV test every 5 years   Chlamydia Sexually active women ages 24 and younger, and women at increased risk for infection Every 3 years if you're at risk or have symptoms   Depression All women in this age group At routine exams   Type 2 diabates, prediabetes All women with no symptoms who are overweight or obese and have 1 or more other risk factors for diabetes At least every 3 years. Also, testing for diabetes during pregnancy after the 24th week.    Type 2 diabetes, prediabetes All women diagnosed with gestational diabetes Lifelong testing every 3 years   Type 2 diabetes All women with prediabetes Every year   Gonorrhea Sexually active women at increased risk for infection At routine exams   Hepatitis C Anyone at increased risk At routine exams   HIV All women should be tested at least once for HIV between the ages of 13 and 64 At routine exams. Those with risk factors for HIV should be tested at least annually.    Obesity  All women in this age group At routine exams   Syphilis Women at increased risk for infection should talk with their healthcare provider At routine exams   Tuberculosis Women at increased risk for infection should talk with their healthcare provider Ask your healthcare provider   Vision All women in this age group At least 1 complete exam in your 20s, and 2 in your 30s   Vaccine2 Who needs it How often   Chickenpox (varicella) All women in this age group who have no record of this infection or vaccine 2 doses; the second dose should be given 4 to 8 weeks after the first dose   Hepatitis A Women at increased risk for infection should talk with their healthcare provider 2 doses given at least 6 months apart   Hepatitis B Women at increased risk for infection should talk with their healthcare provider 3 doses over 6 months; second dose should be given 1 month after the first dose; the third dose should be given at least 2 months after the second dose and at least 4 months after the first dose   Haemophilus influenzae Type B (HIB) Women at increased risk for infection should talk with their healthcare provider 1 to 3 doses   Human papillomavirus (HPV) All women in this age group up to age 26 3 doses; the second dose should be given 1 to 2 months after the first dose and the third dose given 6 months after the first dose   Influenza (flu) All women in this age group Once a year   Measles, mumps, rubella (MMR) All women in this age group who have no record of these infections or vaccines 1 or 2 doses   Meningococcal Women at increased risk for infection should talk with their healthcare provider 1 or more doses   Pneumococcal conjugate vaccine (PCV13) and pneumococcal polysaccharide vaccine (PPSV23) Women at increased risk for infection should talk with their healthcare provider PCV13: 1 dose ages 19 to 65 (protects against 13 types of pneumococcal bacteria)  PPSV23: 1 to 2 doses through age 64, or 1 dose at 65 or older  (protects against 23 types of pneumococcal bacteria)      Tetanus/diphtheria/pertussis (Td/Tdap) booster All women in this age group Td every 10 years, or a one-time dose of Tdap instead of a Td booster after age 18, then Td every 10 years   Counseling Who needs it How often   BRCA gene mutation testing for breast and ovarian cancer susceptibility Women with increased risk for having gene mutation When your risk is known   Breast cancer and chemoprevention Women at high risk for breast cancer When your risk is known   Diet and exercise Women who are overweight or obese When diagnosed, and then at routine exams   Domestic violence Women at the age in which they are able to have children At routine exams   Sexually transmitted infection prevention Women who are sexually active At routine exams   Skin cancer Prevention of skin cancer in fair-skinned adults At routine exams   Use of tobacco and the health effects it can cause All women in this age group Every visit   1According to the ACS, women ages 20 to 39 years should have a clinical breast exam (CBE) as part of their routine health exam every 3 years. Breast self-exams are an option for women starting in their 20s. But the USPSTF does not recommend CBE.  2Those who are 18 years old and not up-to-date on their childhood vaccines should get all appropriate catch-up vaccines recommended by the CDC.  Date Last Reviewed: 10/1/2017  © 5490-3130 [employer]. [address] [place] [address], [place] [address]. All rights reserved. This information is not intended as a substitute for professional medical care. Always follow your healthcare professional's instructions.            Statement Selected

## 2019-11-18 NOTE — OB NEONATOLOGY/PEDIATRICIAN DELIVERY SUMMARY - BABY A: APGAR 5 MIN SCORE, DELIVERY

## 2020-04-02 NOTE — PROGRESS NOTE ADULT - SUBJECTIVE AND OBJECTIVE BOX
PAtient with history of cerclage and two previous  sections came for cerclage removal and she wants to try to deliver vaginaly.  She had no complains and no contractions.  On exam cervix was long and closed.  Cerclage was in place.  She was offered suture removal under spinal anesthesia but wanted to try without anesthesia.  A speculum was placed in the vaginal and cerclage removed without difficulty or complications. There was no bleeding and cervix remained closed after procedure.  The patient will be monitored and then discharged home. Skin normal color for race, warm, dry and intact. No evidence of rash.

## 2020-04-28 NOTE — DISCHARGE NOTE ANTEPARTUM - TEMPERATURE GREATER THAN 100.0 DEGREES F, BY MOUTH
Patient:   DEANN DECKER            MRN: [id number]            FIN: [id number]              Age:   64 years     Sex:  FEMALE     :  54   Associated Diagnoses:   None   Author:   NESSA FLORES     Chief complaint: f/u Covid, respiratory failure     Subjective   Seen in ICU.   Still very unresponsive  Transferring to [facility] bed with tele     Health Status   Allergies:    Allergic Reactions (All)  NKA   Current medications:    Medications (34) Active  Scheduled: (16)  *Potassium Protocol Communication  1 each, N/A, Daily  AmLODIPine 10 mg tab  10 mg 1 tab, Oral, Daily  Aspirin 81 mg chew tab  81 mg 1 tab, Chewed, Daily  Atorvastatin 20 mg tab  20 mg 1 tab, Oral, Q Bedtime  Chlorhexidine gluconate 0.12% ORAL RINSE 15 mL repack  15 mL, Oral Mucosa, Daily  CloNIDine TTS 0.2 mg/24 hr weekly ER patch  1 patch, Topical, Q7 Days  Enoxaparin 40 mg/0.4 mL syringe  40 mg 0.4 mL, Subcutaneous, Q12H  Famotidine 20 mg/2 mL inj SDV  20 mg 2 mL, Slow IV Push, Daily  Labetalol 20 mg/4 mL inj  40 mg 8 mL, Slow IV Push, Q6H  Losartan 50 mg tab  50 mg 1 tab, Oral, Q12H  Montelukast 10 mg tab  10 mg 1 tab, Oral, Q12H  Multivitamin with minerals chew tab  1 tab, Chewed, Daily  NF Albuterol HFA 90 mcg/inh oral MDI (shared)  360 mcg 4 puff, MDI/DPI, BID  PredniSONE 10 mg tab  10 mg 1 tab, Oral, Daily [with breakfast]  PredniSONE 20 mg tab  20 mg 1 tab, Oral, Daily [with breakfast]  Rocuronium 10 mg/mL inj 5 mL MDV  50 mg 5 mL, Slow IV Push, One Time (unscheduled)  Continuous: (3)  DOPamine 400 mg [20 mcg/kg/min] + premixed in Dextrose 5% 250 mL  250 mL, IV, 82.5 mL/hr  NORepinephrine 8 mg [199 mcg/min] + premixed in Sodium Chloride 0.9% 250 mL  250 mL, IV, 373.13 mL/hr  Sodium Chloride 0.9% 250 mL  250 mL, IV, 1,500 mL/hr  PRN: (15)  Acetaminophen 325 mg tab  650 mg 2 tab, Oral, Q4H  Acetaminophen 650 mg suppos  650 mg 1 suppository, Rectal, Q4H  Atropine 0.4 mg/1 mL inj SDV  0.6 mg 1.5 mL, IV Push, Once (scheduled)  Dextrose  (glucose) 40% 15 gm/37.5 gm oral gel UD  15 gm, Oral, As Directed PRN  Dextrose (glucose) 50% 25 gm/50 mL inj  12.5 gm 25 mL, IV Push, As Directed PRN  FentaNYL 100 mcg/2 mL inj SDV  25 mcg 0.5 mL, IV Push, Q15 Minutes  Furosemide 40 mg/4 mL inj SDV  40 mg 4 mL, Slow IV Push, As Directed PRN  Glucagon 1 mg/1 mL emergency kit SDV  1 mg 1 mL, IM, As Directed PRN  HydrALAZINE 20 mg/1 mL inj SDV  20 mg 1 mL, Slow IV Push, Q6H  LORazepam 2 mg/1 mL inj SDV  1 mg 0.5 mL, Slow IV Push, As Directed PRN  MetoCLOPramide 10 mg/2 mL inj SDV  5 mg 1 mL, Slow IV Push, Q6H  Midazolam PF 2 mg/2 mL inj SDV  2 mg 2 mL, Slow IV Push, Q10 Minutes  Morphine PF 2 mg/1 mL inj SDV  2 mg 1 mL, IV Push, Q5 Minutes  Naloxone 0.4 mg/1 mL inj SDV  1 mg 2.5 mL, IV Push, As Directed PRN  NitroGLYCERIN 0.4 mg SL tab 25s  0.4 mg 1 tab, SL, As Directed PRN      Objective   VS/Measurements     Vitals between:   27-APR-2020 14:56:31   TO   28-APR-2020 14:56:31                   LAST RESULT MINIMUM MAXIMUM  Temperature 37.5 36.6 37.5  Heart Rate 96 75 110  Respiratory Rate 35 17 38  NISBP           173 133 200  NIDBP           79 61 134  NIMBP           102 83 141  SpO2                    95 94 99  FiO2                    0.4 0.4 0.4    General:  No acute distress, laying in bed, supine.    HENT:  Normocephalic.    Respiratory:  Respirations are non-labored, Breath sounds are equal, dimished in bases, intubated.   Cardiovascular:  Normal rate, Regular rhythm, No murmur.    Gastrointestinal:  Soft, +BS.    Integumentary:  Warm, Moist, No rash.    Psychiatric:  Cooperative, Appropriate mood & affect.      Results Review   General results     Impression and Plan   Assessment and Plan   65 y/o F w/ HTN, prior CVA w/ mild residual L-sided weakness, dyslipidemia who presented w/ L-sided weakness and dyspnea and was found to have COVID 19.  Acute hypoxic respiratory failure and viral sepsis 2/2 COVID 19 PNA: Present on admission.  -- ID consult (Linda),  appreciate recs  -- ICU consult (Riky), appreciate recs  -- Covid-19 POSITIVE  -- ferritin 1563, CRP 20.4-->4.8, -->327, d-dimer 4.04-->2.44, procalcitonin 0.07  -- intubated 4/9-present (tube exchange 4/15), vent mgmt per intensivist - NOW EXTUBATED  -- doing well after extubation  -- rec'd hydroxychloroquine, azithromycin, methylpred; continue Singulair, Decadron  -- not using therapeutic anticoagulation despite elevated d-dimer and Covid per family's expressed wishes  -- completed course of ceftriaxone and meropenem  -- trend WBC, temps  Generalized weakness: Previously noted to have L-sided weakness earlier in hospitalization. On 4/26 after prolonged intubation and critical illness, noted to have flaccid weakness in all 4 extremities, preservation of strength.  -- neuro consult (Manjit), appreciate recs  -- CT head - only showed old infarct  -- defer to neuro on add'l imaging  -- MRI brain - only shows old infarct  - DDx: critical illness myopathy vs steroid myopathy.  - Guillain-Largo less likely per neurology      HTN, poorly controlled:  -- continue amlodipine, clonidine, resume losartan at higher dose, added scheduledI labetalol  -- now off Decadron  -- carvedilol on hold  Hypernatremia:  -- continued increased free water flushes today  -- repeat BMP in AM  Facial edema: Likely dependent as it started after proning. Rec'd Decadron and Benadryl but minimal improvement until proning was disocntinued.  L-sided weakness in setting of prior CVA w/ mild residual L-sided weakness: CT head  4/8: prior deep R frontoparietal corona radiata, anterior limb of internal capsule and lentiform nucleus lacunar infarct with involutional changes. Neurology following.  Atorvastatin held previously for elevated CK but now restarted as CK has normalized. Continuing aspirin.  Acute metabolic encephlopathy: Likely due to infection and hypoxia. Now on sedation but will re-assess mental status once off sedation.   Transaminitis: Likely due to viral infection. Will monitor as needed but no further evaluation planned.  Non-specific troponin elevation: Troponin 0.11-->0.17. Likely related to infection. No ischemia evaluation planned. On aspirin and statin. Beta blocker being held for bradycardia.  Hypotension: Resolved. Required pressors 4/9-4/10.  Thrombocytopenia: Due to viral illnes. Resolved.   LAKIA: Cr peaked 1.7 on 4/11 and has subsequently improved w/ IVF. Renal following. Will trend BMP as needed.  Other: Continue MVI.  FEN: tube feeds, no IVF  PPX: SC Lovenox, famotidine  DISPO: Transfer to [employer]    Critical care billing time: 35 min   Statement Selected

## 2020-11-03 NOTE — OB RN PATIENT PROFILE - BSA (M2)
Spot Size: 6.5 mm Pulse Duration: 2.5 ms Treatment Number: 1 Wavelength: 532 nm Post-Procedure Care: Immediate endpoint: perifollicular erythema and edema. Vaseline and ice applied. Post care reviewed with patient. Handpiece: n/a nm Detail Level: Detailed Anesthesia Type: 1% lidocaine with epinephrine External Cooling Fan Speed: 0 Frequency (Hz): 3 HZ Total Pulses: 2 passes 725 pulse Fluence (J/Cm2): 1.5 Consent: Written consent obtained, risks reviewed including but not limited to crusting, scabbing, blistering, scarring, darker or lighter pigmentary change, paradoxical hair regrowth, incomplete removal of hair and infection. Frequency (Hz): 2 HZ Location Override: shoulders post inflammatory hypo pigmentation Handpiece: Resolve Post-Care Instructions: I reviewed with the patient in detail post-care instructions. Patient should avoid sun for a minimum of 4 weeks before and after treatment. Spot Size: 2.0 mm Fluence (J/Cm2): 0.4 Hide Pulse Duration?: No Pre-Procedure: Prior to proceeding the treatment areas were cleaned and all present put on their eye protection. Wavelength: 1064 nm Fluence (J/Cm2): 2 1.71

## 2020-11-19 NOTE — OB PROVIDER TRIAGE NOTE - NS_BABIESUTERO_OBGYN_ALL_OB_NU
[No Acute Distress] : no acute distress [Well Nourished] : well nourished [Well Developed] : well developed [Well-Appearing] : well-appearing [Normal Sclera/Conjunctiva] : normal sclera/conjunctiva [PERRL] : pupils equal round and reactive to light [EOMI] : extraocular movements intact 1 [Normal Outer Ear/Nose] : the outer ears and nose were normal in appearance [Normal Oropharynx] : the oropharynx was normal [No JVD] : no jugular venous distention [No Lymphadenopathy] : no lymphadenopathy [Supple] : supple [Thyroid Normal, No Nodules] : the thyroid was normal and there were no nodules present [No Respiratory Distress] : no respiratory distress  [No Accessory Muscle Use] : no accessory muscle use [Clear to Auscultation] : lungs were clear to auscultation bilaterally [Normal Rate] : normal rate  [Regular Rhythm] : with a regular rhythm [Normal S1, S2] : normal S1 and S2 [No Murmur] : no murmur heard [No Carotid Bruits] : no carotid bruits [No Abdominal Bruit] : a ~M bruit was not heard ~T in the abdomen [No Varicosities] : no varicosities [Pedal Pulses Present] : the pedal pulses are present [No Edema] : there was no peripheral edema [No Palpable Aorta] : no palpable aorta [No Extremity Clubbing/Cyanosis] : no extremity clubbing/cyanosis [Soft] : abdomen soft [Non Tender] : non-tender [Non-distended] : non-distended [No Masses] : no abdominal mass palpated [No HSM] : no HSM [Normal Bowel Sounds] : normal bowel sounds [Normal Posterior Cervical Nodes] : no posterior cervical lymphadenopathy [Normal Anterior Cervical Nodes] : no anterior cervical lymphadenopathy [No CVA Tenderness] : no CVA  tenderness [No Spinal Tenderness] : no spinal tenderness [No Joint Swelling] : no joint swelling [Grossly Normal Strength/Tone] : grossly normal strength/tone [No Rash] : no rash [Coordination Grossly Intact] : coordination grossly intact [No Focal Deficits] : no focal deficits [Normal Gait] : normal gait [Normal Affect] : the affect was normal [Normal Insight/Judgement] : insight and judgment were intact

## 2021-02-24 PROBLEM — E28.2 POLYCYSTIC OVARIAN SYNDROME: Chronic | Status: ACTIVE | Noted: 2019-06-11

## 2021-02-25 ENCOUNTER — APPOINTMENT (OUTPATIENT)
Dept: OBGYN | Facility: CLINIC | Age: 36
End: 2021-02-25
Payer: MEDICAID

## 2021-02-25 PROCEDURE — 99385 PREV VISIT NEW AGE 18-39: CPT

## 2021-02-25 PROCEDURE — 99072 ADDL SUPL MATRL&STAF TM PHE: CPT

## 2021-06-07 NOTE — OB RN TRIAGE NOTE - PMH
Incompetent cervix    Inguinal Hernia    Miscarriage  5 weeks gestation, 2007  Vaginal delivery  2008 F 6#
Never

## 2021-08-24 NOTE — OB RN DELIVERY SUMMARY - NS_BREASTINHOURA_OBGYN_ALL_OB
Not applicable as gestational age is greater than or equal to 34 weeks. Offered, feeding was successful

## 2021-11-03 ENCOUNTER — APPOINTMENT (OUTPATIENT)
Dept: OBGYN | Facility: CLINIC | Age: 36
End: 2021-11-03

## 2021-12-28 RX ORDER — ETONOGESTREL AND ETHINYL ESTRADIOL 11.7; 2.7 MG/1; MG/1
0.12-0.015 INSERT, EXTENDED RELEASE VAGINAL
Qty: 3 | Refills: 1 | Status: ACTIVE | COMMUNITY
Start: 2021-12-28 | End: 1900-01-01

## 2022-02-01 NOTE — OB RN PATIENT PROFILE - NS_SOCIALWORKCONS_OBGYN_ALL_OB
balance training/bed mobility training/gait training/postural re-education/strengthening/transfer training No

## 2022-03-11 NOTE — H&P PST ADULT - VENOUS THROMBOEMBOLISM HISTORY
Stephen is a 81 year old who is being evaluated via a billable video visit.      How would you like to obtain your AVS? MyChart  If the video visit is dropped, the invitation should be resent by: Text to cell phone: [phone]  Will anyone else be joining your video visit? No    Video Start Time: 9:20 AM        Wally Mitchell is a 81 year old who presents for the following health issues    HPI     Shaking  Body is shaking, upper body, shoulder, hands  Episode Tuesday noc, after using the bathroom his legs felt weak and he went to the floor. No headache or dizziness. No loc. No chest pain/palps. No vision changes. No paresthesia. Doesn't sound like a presyncopal episodes. Occasional low back pain   Has felt fine since. Intention tremors. Increase in tremulousness in past 2-3 mos. Discontinuing mycophenolate failed to change things.  Doing his home exercises with out limitations.     Restless leg syndrome - happening more frequently      Review of Systems   Constitutional, HEENT, cardiovascular, pulmonary, GI, , musculoskeletal, neuro, skin, endocrine and psych systems are negative, except as otherwise noted.      Objective           Vitals:  No vitals were obtained today due to virtual visit.    Physical Exam   GENERAL: Healthy, alert and no distress  EYES: Eyes grossly normal to inspection.  No discharge or erythema, or obvious scleral/conjunctival abnormalities.  RESP: No audible wheeze, cough, or visible cyanosis.  No visible retractions or increased work of breathing.    SKIN: Visible skin clear. No significant rash, abnormal pigmentation or lesions.  NEURO: Cranial nerves grossly intact.  Mentation and speech appropriate for age.  PSYCH: Mentation appears normal, affect normal/bright, judgement and insight intact, normal speech and appearance well-groomed.    Gustavo was seen today for shaking.    Diagnoses and all orders for this visit:    Episode of generalized weakness    Tremulousness  -      propranolol ER (INDERAL LA) 60 MG 24 hr capsule; Take 1 capsule (60 mg) by mouth daily    Restless leg syndrome  -     pramipexole (MIRAPEX) 0.125 MG tablet; Take 3 tablets (0.375 mg) by mouth At Bedtime      work on lifestyle modification  Advised supportive and symptomatic treatment.  Follow up with Provider - if condition persists or worsens.   F/up with neurology re: his tremulousness.        Video-Visit Details    Type of service:  Video Visit    Video End Time:9:46 AM    Originating Location (pt. Location): Home    Distant Location (provider location):  [facility]     Platform used for Video Visit: Mook[employer]   (0) indicator not present

## 2022-05-06 ENCOUNTER — APPOINTMENT (OUTPATIENT)
Dept: OBGYN | Facility: CLINIC | Age: 37
End: 2022-05-06
Payer: MEDICAID

## 2022-05-06 PROCEDURE — 96372 THER/PROPH/DIAG INJ SC/IM: CPT

## 2022-05-09 ENCOUNTER — APPOINTMENT (OUTPATIENT)
Dept: OBGYN | Facility: CLINIC | Age: 37
End: 2022-05-09

## 2022-05-16 ENCOUNTER — APPOINTMENT (OUTPATIENT)
Dept: OBGYN | Facility: CLINIC | Age: 37
End: 2022-05-16

## 2022-05-24 ENCOUNTER — APPOINTMENT (OUTPATIENT)
Dept: OBGYN | Facility: CLINIC | Age: 37
End: 2022-05-24

## 2022-06-06 RX ORDER — HUMAN RHO(D) IMMUNE GLOBULIN 300 UG/1
1500 INJECTION, SOLUTION INTRAMUSCULAR
Refills: 0 | Status: COMPLETED | OUTPATIENT
Start: 2022-05-06

## 2022-07-08 RX ORDER — ETONOGESTREL AND ETHINYL ESTRADIOL 11.7; 2.7 MG/1; MG/1
0.12-0.015 INSERT, EXTENDED RELEASE VAGINAL
Qty: 1 | Refills: 2 | Status: ACTIVE | COMMUNITY
Start: 2022-07-08 | End: 1900-01-01

## 2022-07-15 RX ORDER — ETONOGESTREL AND ETHINYL ESTRADIOL 11.7; 2.7 MG/1; MG/1
0.12-0.015 INSERT, EXTENDED RELEASE VAGINAL
Qty: 1 | Refills: 0 | Status: ACTIVE | COMMUNITY
Start: 2022-07-15 | End: 1900-01-01

## 2022-08-15 RX ORDER — ETONOGESTREL AND ETHINYL ESTRADIOL 11.7; 2.7 MG/1; MG/1
0.12-0.015 INSERT, EXTENDED RELEASE VAGINAL
Qty: 1 | Refills: 0 | Status: ACTIVE | COMMUNITY
Start: 2022-08-15 | End: 1900-01-01

## 2022-09-20 ENCOUNTER — APPOINTMENT (OUTPATIENT)
Dept: OBGYN | Facility: CLINIC | Age: 37
End: 2022-09-20

## 2023-04-18 ENCOUNTER — APPOINTMENT (OUTPATIENT)
Dept: OBGYN | Facility: CLINIC | Age: 38
End: 2023-04-18
Payer: MEDICAID

## 2023-04-18 VITALS
DIASTOLIC BLOOD PRESSURE: 87 MMHG | HEIGHT: 64 IN | SYSTOLIC BLOOD PRESSURE: 118 MMHG | WEIGHT: 163 LBS | BODY MASS INDEX: 27.83 KG/M2

## 2023-04-18 DIAGNOSIS — N91.2 AMENORRHEA, UNSPECIFIED: ICD-10-CM

## 2023-04-18 PROCEDURE — 99212 OFFICE O/P EST SF 10 MIN: CPT

## 2023-04-18 NOTE — PHYSICAL EXAM
[Chaperone Present] : A chaperone was present in the examining room during all aspects of the physical examination [Appropriately responsive] : appropriately responsive [No Acute Distress] : no acute distress [Alert] : alert [No Lymphadenopathy] : no lymphadenopathy [Regular Rate Rhythm] : regular rate rhythm [No Murmurs] : no murmurs [Clear to Auscultation B/L] : clear to auscultation bilaterally [Soft] : soft [Non-tender] : non-tender [Non-distended] : non-distended [No Lesions] : no lesions [No HSM] : No HSM [No Mass] : no mass [Oriented x3] : oriented x3 [Examination Of The Breasts] : a normal appearance [No Masses] : no breast masses were palpable [Labia Majora] : normal [Labia Minora] : normal [Normal] : normal [Uterine Adnexae] : normal

## 2023-04-20 LAB
ABO + RH PNL BLD: NORMAL
APPEARANCE: CLEAR
BACTERIA UR CULT: NORMAL
BACTERIA: NEGATIVE
BASOPHILS # BLD AUTO: 0.07 K/UL
BASOPHILS NFR BLD AUTO: 0.6 %
BILIRUBIN URINE: NEGATIVE
BLD GP AB SCN SERPL QL: NORMAL
BLOOD URINE: ABNORMAL
C TRACH RRNA SPEC QL NAA+PROBE: NOT DETECTED
COLOR: NORMAL
EOSINOPHIL # BLD AUTO: 0.03 K/UL
EOSINOPHIL NFR BLD AUTO: 0.3 %
ESTIMATED AVERAGE GLUCOSE: 100 MG/DL
GLUCOSE QUALITATIVE U: NEGATIVE
GLUCOSE SERPL-MCNC: 113 MG/DL
HBA1C MFR BLD HPLC: 5.1 %
HBV SURFACE AG SER QL: NONREACTIVE
HCG SERPL-MCNC: ABNORMAL MIU/ML
HCT VFR BLD CALC: 43.7 %
HGB A MFR BLD: 97.1 %
HGB A2 MFR BLD: 2.9 %
HGB BLD-MCNC: 14.3 G/DL
HGB FRACT BLD-IMP: NORMAL
HIV1+2 AB SPEC QL IA.RAPID: NONREACTIVE
HPV 16 E6+E7 MRNA CVX QL NAA+PROBE: NOT DETECTED
HPV HIGH+LOW RISK DNA PNL CVX: NOT DETECTED
HPV18+45 E6+E7 MRNA CVX QL NAA+PROBE: NOT DETECTED
HYALINE CASTS: 1 /LPF
IMM GRANULOCYTES NFR BLD AUTO: 0.4 %
KETONES URINE: NEGATIVE
LEAD BLD-MCNC: <1 UG/DL
LEUKOCYTE ESTERASE URINE: NEGATIVE
LYMPHOCYTES # BLD AUTO: 2.69 K/UL
LYMPHOCYTES NFR BLD AUTO: 23.6 %
MAN DIFF?: NORMAL
MCHC RBC-ENTMCNC: 29.2 PG
MCHC RBC-ENTMCNC: 32.7 GM/DL
MCV RBC AUTO: 89.4 FL
MEV IGG FLD QL IA: >300 AU/ML
MEV IGG+IGM SER-IMP: POSITIVE
MICROSCOPIC-UA: NORMAL
MONOCYTES # BLD AUTO: 0.65 K/UL
MONOCYTES NFR BLD AUTO: 5.7 %
MUV AB SER-ACNC: POSITIVE
MUV IGG SER QL IA: 52.2 AU/ML
N GONORRHOEA RRNA SPEC QL NAA+PROBE: NOT DETECTED
NEUTROPHILS # BLD AUTO: 7.91 K/UL
NEUTROPHILS NFR BLD AUTO: 69.4 %
NITRITE URINE: NEGATIVE
PH URINE: 6
PLATELET # BLD AUTO: 316 K/UL
PROGEST SERPL-MCNC: 14.8 NG/ML
PROTEIN URINE: NORMAL
RBC # BLD: 4.89 M/UL
RBC # FLD: 12.9 %
RED BLOOD CELLS URINE: 5 /HPF
RUBV IGG FLD-ACNC: 1.2 INDEX
RUBV IGG SER-IMP: POSITIVE
SOURCE AMPLIFICATION: NORMAL
SPECIFIC GRAVITY URINE: 1.02
SQUAMOUS EPITHELIAL CELLS: 1 /HPF
T PALLIDUM AB SER QL IA: NEGATIVE
UROBILINOGEN URINE: NORMAL
WBC # FLD AUTO: 11.4 K/UL
WHITE BLOOD CELLS URINE: 1 /HPF

## 2023-04-21 LAB
M TB IFN-G BLD-IMP: NEGATIVE
QUANTIFERON TB PLUS MITOGEN MINUS NIL: >10 IU/ML
QUANTIFERON TB PLUS NIL: 0.02 IU/ML
QUANTIFERON TB PLUS TB1 MINUS NIL: 0 IU/ML
QUANTIFERON TB PLUS TB2 MINUS NIL: -0.01 IU/ML

## 2023-04-23 LAB — CYTOLOGY CVX/VAG DOC THIN PREP: NORMAL

## 2023-04-24 LAB — FMR1 GENE MUT ANL BLD/T: NORMAL

## 2023-04-25 LAB — CFTR MUT TESTED BLD/T: NEGATIVE

## 2023-04-26 LAB — AR GENE MUT ANL BLD/T: NORMAL

## 2023-04-28 NOTE — PLAN
[FreeTextEntry1] : 37 year old presenting with amenorrhea \par -f/u PAP and GC/CT done today\par -f/u prenatal blood work drawn today\par -f/u HCG done today \par -Rx sent for PNV\par -discussed cervical lengths and possible cerclage insertion before she leaves to [place] \par -RTO 2 weeks for first trimester ultrasound and review of prenatal lab results

## 2023-04-28 NOTE — HISTORY OF PRESENT ILLNESS
[Men] : men [Currently Active] : currently active [No] : No [FreeTextEntry1] : Patient is a 37 year old who presents after she had a positive home pregnancy test. LMP 3/9/23, which are irregular. She is endorsing occasional nausea and breast tenderness. Unofficial sono reveals gestational sac with fetal pole, no +FHR as it is early GA. Pt has hx of cerclage in past pregnancies d/t hx of cervical incompetence. She is leaving to [place] on 6/22/23 and will return after three weeks. \par

## 2023-05-01 ENCOUNTER — APPOINTMENT (OUTPATIENT)
Dept: OBGYN | Facility: CLINIC | Age: 38
End: 2023-05-01
Payer: MEDICAID

## 2023-05-01 ENCOUNTER — APPOINTMENT (OUTPATIENT)
Dept: ANTEPARTUM | Facility: CLINIC | Age: 38
End: 2023-05-01
Payer: MEDICAID

## 2023-05-01 VITALS — DIASTOLIC BLOOD PRESSURE: 87 MMHG | WEIGHT: 164 LBS | BODY MASS INDEX: 28.15 KG/M2 | SYSTOLIC BLOOD PRESSURE: 119 MMHG

## 2023-05-01 PROCEDURE — ZZZZZ: CPT

## 2023-05-01 PROCEDURE — 76817 TRANSVAGINAL US OBSTETRIC: CPT

## 2023-05-01 PROCEDURE — 76801 OB US < 14 WKS SINGLE FETUS: CPT

## 2023-05-23 ENCOUNTER — APPOINTMENT (OUTPATIENT)
Dept: OBGYN | Facility: CLINIC | Age: 38
End: 2023-05-23
Payer: MEDICAID

## 2023-05-23 ENCOUNTER — NON-APPOINTMENT (OUTPATIENT)
Age: 38
End: 2023-05-23

## 2023-05-23 ENCOUNTER — APPOINTMENT (OUTPATIENT)
Dept: ANTEPARTUM | Facility: CLINIC | Age: 38
End: 2023-05-23

## 2023-05-23 VITALS
DIASTOLIC BLOOD PRESSURE: 87 MMHG | WEIGHT: 167 LBS | BODY MASS INDEX: 28.51 KG/M2 | HEIGHT: 64 IN | SYSTOLIC BLOOD PRESSURE: 120 MMHG

## 2023-05-23 PROCEDURE — 0502F SUBSEQUENT PRENATAL CARE: CPT

## 2023-05-25 ENCOUNTER — NON-APPOINTMENT (OUTPATIENT)
Age: 38
End: 2023-05-25

## 2023-06-08 ENCOUNTER — APPOINTMENT (OUTPATIENT)
Dept: ANTEPARTUM | Facility: CLINIC | Age: 38
End: 2023-06-08
Payer: MEDICAID

## 2023-06-08 ENCOUNTER — APPOINTMENT (OUTPATIENT)
Dept: OBGYN | Facility: CLINIC | Age: 38
End: 2023-06-08
Payer: MEDICAID

## 2023-06-08 VITALS
HEIGHT: 64 IN | BODY MASS INDEX: 28.34 KG/M2 | DIASTOLIC BLOOD PRESSURE: 84 MMHG | WEIGHT: 166 LBS | SYSTOLIC BLOOD PRESSURE: 118 MMHG

## 2023-06-08 PROCEDURE — 76817 TRANSVAGINAL US OBSTETRIC: CPT | Mod: 59

## 2023-06-08 PROCEDURE — 76815 OB US LIMITED FETUS(S): CPT

## 2023-06-08 PROCEDURE — 0502F SUBSEQUENT PRENATAL CARE: CPT

## 2023-06-21 ENCOUNTER — OUTPATIENT (OUTPATIENT)
Dept: OUTPATIENT SERVICES | Facility: HOSPITAL | Age: 38
LOS: 1 days | End: 2023-06-21

## 2023-06-21 VITALS
RESPIRATION RATE: 16 BRPM | TEMPERATURE: 98 F | SYSTOLIC BLOOD PRESSURE: 112 MMHG | OXYGEN SATURATION: 99 % | HEIGHT: 60 IN | DIASTOLIC BLOOD PRESSURE: 78 MMHG | HEART RATE: 80 BPM | WEIGHT: 164.91 LBS

## 2023-06-21 DIAGNOSIS — N88.3 INCOMPETENCE OF CERVIX UTERI: ICD-10-CM

## 2023-06-21 LAB
BLD GP AB SCN SERPL QL: NEGATIVE — SIGNIFICANT CHANGE UP
HCT VFR BLD CALC: 36.3 % — SIGNIFICANT CHANGE UP (ref 34.5–45)
HGB BLD-MCNC: 12.3 G/DL — SIGNIFICANT CHANGE UP (ref 11.5–15.5)
MCHC RBC-ENTMCNC: 29.3 PG — SIGNIFICANT CHANGE UP (ref 27–34)
MCHC RBC-ENTMCNC: 33.9 GM/DL — SIGNIFICANT CHANGE UP (ref 32–36)
MCV RBC AUTO: 86.4 FL — SIGNIFICANT CHANGE UP (ref 80–100)
NRBC # BLD: 0 /100 WBCS — SIGNIFICANT CHANGE UP (ref 0–0)
NRBC # FLD: 0 K/UL — SIGNIFICANT CHANGE UP (ref 0–0)
PLATELET # BLD AUTO: 231 K/UL — SIGNIFICANT CHANGE UP (ref 150–400)
RBC # BLD: 4.2 M/UL — SIGNIFICANT CHANGE UP (ref 3.8–5.2)
RBC # FLD: 12.8 % — SIGNIFICANT CHANGE UP (ref 10.3–14.5)
RH IG SCN BLD-IMP: NEGATIVE — SIGNIFICANT CHANGE UP
WBC # BLD: 8.01 K/UL — SIGNIFICANT CHANGE UP (ref 3.8–10.5)
WBC # FLD AUTO: 8.01 K/UL — SIGNIFICANT CHANGE UP (ref 3.8–10.5)

## 2023-06-21 NOTE — H&P PST ADULT - TEMPERATURE IN CELSIUS (DEGREES C)
ECG  No previous ECGs available     Labs:  CBC:   Recent Labs      01/02/18   1611  01/03/18   0511  01/04/18   0606   WBC  8.5  5.3  5.3   HGB  14.3  14.0  14.0   PLT  288  270  273     BMP:    Recent Labs      01/02/18   1611  01/03/18   0511  01/04/18   0606   NA  142  139  137   K  4.5  4.7  4.4   CL  101  100  101   CO2  26  27  26   BUN  30*  23*  17   CREATININE  1.02  0.77  0.64*   GLUCOSE  98  109*  125*     S. Calcium:  Recent Labs      01/04/18   0606   CALCIUM  9.7     S. Ionized Calcium:No results for input(s): IONCA in the last 72 hours. S. Magnesium:  Recent Labs      01/03/18   0511   MG  2.3     S. Phosphorus:No results for input(s): PHOS in the last 72 hours. S. Glucose:No results for input(s): POCGLU in the last 72 hours. Glycosylated hemoglobin A1C:   Lab Results   Component Value Date    LABA1C 5.6 01/03/2018     Hepatic: No results for input(s): AST, ALT, ALB in the last 72 hours. Invalid input(s):  PROT,  BILITOT,  BILIDIR,  ALKPHOS  CARDIAC ENZY:   Recent Labs      01/02/18   1611  01/02/18   2257   TROPONINT  <0.03  <0.03     INR: No results for input(s): INR in the last 72 hours. BNP: No results for input(s): BNP in the last 72 hours. Invalid input(s):  PROBNP  ABGs: No results for input(s): PH, PCO2, PO2, HCO3, O2SAT in the last 72 hours. Lipids:   Recent Labs      01/03/18   0511   CHOL  152   TRIG  124   HDL  33*     Pancreatic functions:No results for input(s): LIPASE, AMYLASE in the last 72 hours. S. Lactic Acid: No results for input(s): LACTA in the last 72 hours. Thyroid functions:   Lab Results   Component Value Date    TSH <0.01 01/03/2018      U/A:No results for input(s): NITRITE, COLORU, WBCUA, RBCUA, MUCUS, BACTERIA, CLARITYU, SPECGRAV, LEUKOCYTESUR, BLOODU, GLUCOSEU, AMORPHOUS in the last 72 hours.     Invalid input(s): John Hamm    Imaging/Diagonstics:     Xr Chest Standard (2 Vw)    Result Date: 1/2/2018  EXAMINATION: TWO VIEWS OF THE CHEST 1/2/2018 4:36 pm 36.7 COMPARISON: None. HISTORY: ORDERING SYSTEM PROVIDED HISTORY: chest pain TECHNOLOGIST PROVIDED HISTORY: Reason for exam:->chest pain Ordering Physician Provided Reason for Exam: chest pain Acuity: Acute Type of Exam: Initial FINDINGS: Heart appears normal in size. Lungs are hyperinflated without focal lung consolidation, pneumothorax, pleural effusion or free air. Osseous structures appear grossly intact. No acute process. ASSESSMENT  and  PLAN     Principal Problem:    Chest pain  Active Problems:    Atrial fibrillation (HCC)    CHF (congestive heart failure) (Former[facility])    Plan:    Atypical Chest Pain  -Troponin negative X 2  -EKG - Atrial fibrillation  -Stress Test in am  --NPO after midnight  -Check magnesium, TSH, Lipid panel, & HgbA1c in am  -- on admission  -Check 2D echocardiogram  -Pain/nausea control  -EKG PRN chest pain  -continue home dose of Eliquis, amiodorone, and digoxin    Atrial Fibrillation, recurrent  -patient reports cardioversion x2 in past with discussion of ablation if a-fib recurs  --consult cardiologist for further plans  ---Patient currently under care of Dr. Leeroy Ferris    History of CHF  - on admission (normal range for patient's age)  -CXR - nothing acute  -check 2D echo       oid from amioderone          tsh low     check ab     scan     lopressor      1/4     afin rated 80   lopressor dose increased      Will see if rate remains controlled with ambulation     stress neg   echo noted   no chf   tsh low from amioderone   cardio to decide     Gretchen Wilkinson MD   1/4/2018  11:48 AM    [address]    Phone [phone]

## 2023-06-21 NOTE — H&P PST ADULT - PROBLEM SELECTOR PLAN 1
Patient tentatively scheduled for cerclage placement on 6/27/23.  Pre-op instructions provided. Pt given verbal and written instructions with teach back . Pt verbalized understanding with return demonstration.

## 2023-06-21 NOTE — H&P PST ADULT - HISTORY OF PRESENT ILLNESS
37 year old female who is 15 weeks pregnant with no PMH of  presents to Presurgical testing with diagnosis of incompetence of cervix uteri  scheduled for cerclage placement.  37 year old female who is 15 weeks pregnant with no PMH presents to Presurgical testing with diagnosis of incompetence of cervix uteri  scheduled for cerclage placement.

## 2023-06-22 ENCOUNTER — NON-APPOINTMENT (OUTPATIENT)
Age: 38
End: 2023-06-22

## 2023-06-22 ENCOUNTER — APPOINTMENT (OUTPATIENT)
Dept: OBGYN | Facility: CLINIC | Age: 38
End: 2023-06-22
Payer: MEDICAID

## 2023-06-22 ENCOUNTER — APPOINTMENT (OUTPATIENT)
Dept: ANTEPARTUM | Facility: CLINIC | Age: 38
End: 2023-06-22
Payer: MEDICAID

## 2023-06-22 VITALS — DIASTOLIC BLOOD PRESSURE: 69 MMHG | WEIGHT: 165 LBS | SYSTOLIC BLOOD PRESSURE: 100 MMHG | BODY MASS INDEX: 28.32 KG/M2

## 2023-06-22 PROCEDURE — 76815 OB US LIMITED FETUS(S): CPT

## 2023-06-22 PROCEDURE — 0502F SUBSEQUENT PRENATAL CARE: CPT

## 2023-06-22 PROCEDURE — 76817 TRANSVAGINAL US OBSTETRIC: CPT | Mod: 59

## 2023-06-26 ENCOUNTER — TRANSCRIPTION ENCOUNTER (OUTPATIENT)
Age: 38
End: 2023-06-26

## 2023-06-27 ENCOUNTER — INPATIENT (INPATIENT)
Facility: HOSPITAL | Age: 38
LOS: 0 days | Discharge: HOME | End: 2023-06-27
Attending: OBSTETRICS & GYNECOLOGY | Admitting: OBSTETRICS & GYNECOLOGY
Payer: MEDICAID

## 2023-06-27 ENCOUNTER — TRANSCRIPTION ENCOUNTER (OUTPATIENT)
Age: 38
End: 2023-06-27

## 2023-06-27 VITALS — HEART RATE: 86 BPM | RESPIRATION RATE: 15 BRPM | OXYGEN SATURATION: 100 %

## 2023-06-27 VITALS — DIASTOLIC BLOOD PRESSURE: 70 MMHG | SYSTOLIC BLOOD PRESSURE: 109 MMHG | HEART RATE: 82 BPM

## 2023-06-27 DIAGNOSIS — N88.3 INCOMPETENCE OF CERVIX UTERI: ICD-10-CM

## 2023-06-27 DIAGNOSIS — Z98.890 OTHER SPECIFIED POSTPROCEDURAL STATES: Chronic | ICD-10-CM

## 2023-06-27 LAB
BLD GP AB SCN SERPL QL: NEGATIVE — SIGNIFICANT CHANGE UP
HCT VFR BLD CALC: 35.4 % — SIGNIFICANT CHANGE UP (ref 34.5–45)
HGB BLD-MCNC: 12.2 G/DL — SIGNIFICANT CHANGE UP (ref 11.5–15.5)
MCHC RBC-ENTMCNC: 30 PG — SIGNIFICANT CHANGE UP (ref 27–34)
MCHC RBC-ENTMCNC: 34.5 GM/DL — SIGNIFICANT CHANGE UP (ref 32–36)
MCV RBC AUTO: 87 FL — SIGNIFICANT CHANGE UP (ref 80–100)
NRBC # BLD: 0 /100 WBCS — SIGNIFICANT CHANGE UP (ref 0–0)
NRBC # FLD: 0 K/UL — SIGNIFICANT CHANGE UP (ref 0–0)
PLATELET # BLD AUTO: 229 K/UL — SIGNIFICANT CHANGE UP (ref 150–400)
RBC # BLD: 4.07 M/UL — SIGNIFICANT CHANGE UP (ref 3.8–5.2)
RBC # FLD: 12.8 % — SIGNIFICANT CHANGE UP (ref 10.3–14.5)
RH IG SCN BLD-IMP: NEGATIVE — SIGNIFICANT CHANGE UP
WBC # BLD: 8.65 K/UL — SIGNIFICANT CHANGE UP (ref 3.8–10.5)
WBC # FLD AUTO: 8.65 K/UL — SIGNIFICANT CHANGE UP (ref 3.8–10.5)

## 2023-06-27 PROCEDURE — 59320 REVISION OF CERVIX: CPT

## 2023-06-27 RX ORDER — SODIUM CHLORIDE 9 MG/ML
1000 INJECTION, SOLUTION INTRAVENOUS
Refills: 0 | Status: DISCONTINUED | OUTPATIENT
Start: 2023-06-27 | End: 2023-06-27

## 2023-06-27 RX ORDER — INDOMETHACIN 50 MG
50 CAPSULE ORAL ONCE
Refills: 0 | Status: COMPLETED | OUTPATIENT
Start: 2023-06-27 | End: 2023-06-27

## 2023-06-27 RX ORDER — CITRIC ACID/SODIUM CITRATE 300-500 MG
30 SOLUTION, ORAL ORAL ONCE
Refills: 0 | Status: COMPLETED | OUTPATIENT
Start: 2023-06-27 | End: 2023-06-27

## 2023-06-27 RX ORDER — INDOMETHACIN 50 MG
25 CAPSULE ORAL EVERY 6 HOURS
Refills: 0 | Status: DISCONTINUED | OUTPATIENT
Start: 2023-06-27 | End: 2023-06-27

## 2023-06-27 RX ORDER — INDOMETHACIN 50 MG
1 CAPSULE ORAL
Qty: 8 | Refills: 0
Start: 2023-06-27 | End: 2023-06-28

## 2023-06-27 RX ORDER — FAMOTIDINE 10 MG/ML
20 INJECTION INTRAVENOUS ONCE
Refills: 0 | Status: COMPLETED | OUTPATIENT
Start: 2023-06-27 | End: 2023-06-27

## 2023-06-27 RX ORDER — OXYTOCIN 10 UNIT/ML
333.33 VIAL (ML) INJECTION
Qty: 20 | Refills: 0 | Status: DISCONTINUED | OUTPATIENT
Start: 2023-06-27 | End: 2023-06-27

## 2023-06-27 RX ADMIN — FAMOTIDINE 20 MILLIGRAM(S): 10 INJECTION INTRAVENOUS at 08:10

## 2023-06-27 RX ADMIN — Medication 50 MILLIGRAM(S): at 08:10

## 2023-06-27 RX ADMIN — Medication 30 MILLILITER(S): at 08:10

## 2023-06-27 RX ADMIN — SODIUM CHLORIDE 125 MILLILITER(S): 9 INJECTION, SOLUTION INTRAVENOUS at 10:24

## 2023-06-27 RX ADMIN — Medication 25 MILLIGRAM(S): at 14:21

## 2023-06-27 RX ADMIN — Medication 50 MILLIGRAM(S): at 08:14

## 2023-06-27 RX ADMIN — SODIUM CHLORIDE 200 MILLILITER(S): 9 INJECTION, SOLUTION INTRAVENOUS at 07:50

## 2023-06-27 NOTE — DISCHARGE NOTE ANTEPARTUM - HOSPITAL COURSE
Patient was admitted for history indicated cerclage. Please see procedure note for details. She was discharged on POD#0 in stable condition and voiding spontaneously.   +FH post operatively. Patient will follow up outpatient with Dr Ybarra.

## 2023-06-27 NOTE — DISCHARGE NOTE ANTEPARTUM - PLAN OF CARE
Follow up with your doctor as scheduled. Call your doctor if you have regular contractions, leakage of fluid, severe abdominal pain or decreased fetal movement.

## 2023-06-27 NOTE — DISCHARGE NOTE ANTEPARTUM - CARE PROVIDER_API CALL
Rony Ybarra  Maternal/Fetal Medicine  [address], [address]  [place] [address]  Phone: [phone]  Fax: [phone]  Follow Up Time: 1 week

## 2023-06-27 NOTE — OB PROVIDER H&P - NSHPPHYSICALEXAM_GEN_ALL_CORE
Vitals: ICU Vital Signs Last 24 Hrs  T(C): 36.9 (27 Jun 2023 07:17), Max: 36.9 (27 Jun 2023 07:03)  T(F): 98.4 (27 Jun 2023 07:17), Max: 98.42 (27 Jun 2023 07:03)  HR: 82 (27 Jun 2023 07:17) (82 - 82)  BP: 109/70 (27 Jun 2023 07:17) (109/70 - 109/70)  BP(mean): --  ABP: --  ABP(mean): --  RR: 14 (27 Jun 2023 07:17) (14 - 14)  SpO2: --    O2 Parameters below as of 27 Jun 2023 07:17  Patient On (Oxygen Delivery Method): room air    General: A&O x3  Heart: RRR, S1 & S2  Lungs: CTA b/l, good inspiratory/expiratory effort. No ronchi, no rales  Abdomen: Soft, nondistended, nontender    Extremities: FROM, bilateral 1+ LE edema  Neuro: grossly intact

## 2023-06-27 NOTE — DISCHARGE NOTE ANTEPARTUM - PATIENT PORTAL LINK FT
You can access the FollowMyHealth Patient Portal offered by [facility] by registering at the following website: [email or web address]. By joining [employer]’s FollowMyHealth portal, you will also be able to view your health information using other applications (apps) compatible with our system.

## 2023-06-27 NOTE — OB RN INTRAOPERATIVE NOTE - NSOBSELHIDDEN_OBGYN_ALL_OB_FT
[NSOBAttendingProcedure1_OBGYN_ALL_OB_FT:[id number]=],[NSRNCirculatorProcedure1_OBGYN_ALL_OB_FT:[id number]=]

## 2023-06-27 NOTE — OB PROVIDER H&P - NSHPSOCIALHISTORY_GEN_ALL_CORE
Lives at home with  and children, feels safe. Denies alcohol/tobacco/drug use during and before pregnancy. Denies GC/Chlam/HIV/herpes/STIs.    Psych: Denies PPD, depression, anxiety

## 2023-06-27 NOTE — OB PROVIDER H&P - HISTORY OF PRESENT ILLNESS
38yo female  JOHN 23 presents @15.5 weeks for scheduled prophylactic cerclage placement. Denies shortness of breath, fever, chills or cough.  Denies vaginal bleeding, leakage of fluids, or contractions today. Reports positive fetal movement.    Antepartum Course: pt declined NT and diagnostic testing  Prenatal labs:  -T&S: O-  -Rubella: Immune  -Hep B: Nonreactive  -HIV: Nonreactive  -RPR: Negative

## 2023-06-27 NOTE — DISCHARGE NOTE ANTEPARTUM - MEDICATION SUMMARY - MEDICATIONS TO TAKE
I will START or STAY ON the medications listed below when I get home from the hospital:    indomethacin 25 mg oral capsule  -- 1 cap(s) by mouth every 6 hours MDD: 4 tabs  -- Indication: For cerclage    Prenatal Multivitamins oral tablet  -- orally once a day  -- Indication: For prenatals

## 2023-06-27 NOTE — BRIEF OPERATIVE NOTE - NSICDXBRIEFPROCEDURE_GEN_ALL_CORE_FT
PROCEDURES:  Roe cerclage of cervix during pregnancy by vaginal approach 27-Jun-2023 09:38:53  Patricia Naidu

## 2023-06-27 NOTE — BRIEF OPERATIVE NOTE - OPERATION/FINDINGS
History indicated Roe cerclage placed without issue using two proline 1mm sutures.   +FH and grossly normal fluid at the end of the procedure.

## 2023-06-27 NOTE — DISCHARGE NOTE ANTEPARTUM - CARE PLAN
1 Principal Discharge DX:	Roe cerclage present  Assessment and plan of treatment:	Follow up with your doctor as scheduled. Call your doctor if you have regular contractions, leakage of fluid, severe abdominal pain or decreased fetal movement.

## 2023-06-27 NOTE — OB PROVIDER H&P - NS_OBGYNHISTORY_OBGYN_ALL_OB_FT
:  SAB, no d&c  G2: 3/31/2008, , 6#, FT, cerlcage  G3: 2011, pC/s, twins, 34w, PPROM, breech, 3#, 4#, cerclage  G4: 2013, rC/s, 39w, 6#13, cerclage  G5: 2019, , 38w, 6#, cerclage  G6-7: [facility] x2, no d&c  G8: current    Gyn Hx: PCOS, Denies fibroids, abnormal pap smears

## 2023-06-27 NOTE — DISCHARGE NOTE ANTEPARTUM - NS MD DC FALL RISK RISK
For information on Fall & Injury Prevention, visit: [email or web address] OR  [email or web address] OR  https://www.cdc.gov/steadi/patient.html

## 2023-06-27 NOTE — OB PROVIDER H&P - NSLOWPPHRISK_OBGYN_A_OB
Alejandro Pregnancy/Less than or equal to 4 previous vaginal births/No known bleeding disorder/No history of postpartum hemorrhage

## 2023-06-27 NOTE — DISCHARGE NOTE ANTEPARTUM - DISCHARGE DATE
[FreeTextEntry1] : Independently reviewed the following imaging:\par - CT chest w/ IV contrast on 8/10/21\par 
27-Jun-2023

## 2023-06-27 NOTE — DISCHARGE NOTE ANTEPARTUM - IF BREASTFEEDING, ADD A TOTAL OF 500 EXTRA CALORIES EACH DAY
This note was copied from a baby's chart.  Mother has pumped a couple of times so far, using 24mm flange, has been able to get several syringe fulls of milk. Evaluated flange fit and recommended 27 mm and to lubricate inside of flange with lanolin. Discussed settings to use for HGP. Encouraged mother to pump every 3 hours, discussed importance of consistent stimulation, and discussed colostrum expectations. Mother expressed understanding. Faxed pump prescription.    Statement Selected

## 2023-06-27 NOTE — OB PROVIDER H&P - ASSESSMENT
38yo  @ 15.5w  Dx: Scheduled prophylactic cerclage placement    - Admit to L&D  - NPO  - IV access, CBC/T&C  - Pepcid and Bicitra as per pre-op policy  - Anesthesia consult   - Blood transfusion consent  - Operative Consent to be discussed and obtained by Dr. Ybarra  - Plan to move to OR on time schedule  - Discussed with Dr. Amada Bautista, PAC

## 2023-06-27 NOTE — OB PROVIDER H&P - NSICDXPASTSURGICALHX_GEN_ALL_CORE_FT
PAST SURGICAL HISTORY:  History of  x2    History of cervical cerclage     Inguinal Hernia Repair

## 2023-06-27 NOTE — DISCHARGE NOTE ANTEPARTUM - PROVIDER RX CONTACT NUMBER
After Visit Summary      Facility     Name Address Phone Fax    [facility] [address]  [facility] [address] [phone]           Patient Discharge Summary   1/31/2017    Randy Mederos            Please bring this medication reconciliation form to your next doctor’s appointment(s). Please update the form if you stop taking any of these medications or you start taking any new medications including over the counter medications. Also please carry a copy of this form with you at all times in the event of an emergency.    A copy of these discharge instructions was reviewed with and given to the patient/patient representative/Guardian/Caregiver at discharge.          The doctor who took care of you in the hospital     Provider Specialty    Trip Sepulveda MD Urology      Allergies as of 1/31/2017        Reactions    Bee Sting SWELLING           Discharge Medications              What to Do with Your Medications      START taking these medications today unless otherwise stated        Details    Morning Noon Evening Bedtime As needed    cephALEXin 500 MG capsule   Commonly known as:  KEFLEX   Next Dose Due:  1/31/2017          Take 1 capsule by mouth 2 times daily.   Authorizing Provider:  Trip Sepulveda                                  HYDROcodone-acetaminophen 5-325 MG per tablet   Commonly known as:  NORCO   Next Dose Due:  1/31/2017          Take 1-2 tablets by mouth every 4 hours as needed for Pain.   Authorizing Provider:  Trip Sepulveda   Last Dose:  1 tablet on 1/31/2017  9:02 AM                                                        CONTINUE taking these medications which have NOT CHANGED        Details    Morning Noon Evening Bedtime As needed    atorvastatin 20 MG tablet   Commonly known as:  LIPITOR        Take 20 mg by mouth daily.                            DOCUSATE SODIUM PO                                 FAM Unable to Find Medication           once. Indications: ointment for nae n control                            fluticasone 50 MCG/ACT nasal spray   Commonly known as:  FLONASE        Spray in each nostril as needed.                            FOLIC ACID PO                                 hydrochlorothiazide 25 MG tablet   Commonly known as:  HYDRODIURIL        Take 25 mg by mouth daily.                            Lactulose 20 GM/30ML Solution        Take by mouth 2 times daily.                            * QUEtiapine 50 MG tablet   Commonly known as:  SEROquel        Take 50 mg by mouth 2 times daily.                            * QUEtiapine 25 MG tablet   Commonly known as:  SEROquel        Take 25 mg by mouth as needed.                                                   * Notice:  This list has 2 medication(s) that are the same as other medications prescribed for you. Read the directions carefully, and ask your doctor or other care provider to review them with you.      STOP taking these medications, discuss with your Pharmacist        Reason for stopping Comments    aspirin 81 MG tablet                                    Where to Get Your Medications      You are receiving a paper prescription for the following medications, you can take these to any pharmacy.     Bring a paper prescription for each of these medications     cephALEXin 500 MG capsule    HYDROcodone-acetaminophen 5-325 MG per tablet                 Discharge Instructions       DISCHARGE INSTRUCTIONS:   OUTPATIENT DEPARTMENT AND THE  [facility]    DIET  _x__ As tolerated  ___Other:    MEDICATIONS  _x__ Resume your usual medication.  _x__ Prescription given/filled.  ___ Take the medications that your doctor has prescribed for you.  ___ Avoid any aspirin or aspirin containing products for ____ days.    DRESSING  ___ Keep the dressing clean, dry and in place until you see your doctor.  ___ Reinforce the dressing as needed.  ___ Change the dressing:   ___ Remove the dressing:    _x__ Other:     BATHING  ___ Sponge bath until after your first office visit.  _x__ Shower / Shampoo.  ___ Tub Bath  ___ No swimming until directed by physician.    ACTIVITY  _x__ Up as tolerated/no restrictions.  ___ No lifting more than:  ___ Return to work or school:  ___ May drive a car:  ___ Other:    Patient/Family given For your Well Being Teaching Sheet:  _x__ Care After Anesthesia/ Sedation  _x_ Pain Management Tips  _x_ About Surgical Site infection  ___ Smoking and second hand Smoke information  ___ Other:     Supplies:    FOLLOW-UP CARE  _x_ Make an appointment to see your doctor  ___ Call for results in ______days    Notify your physician immediately if you experience any of the following symptoms:  • If the operative site is visible, observe the area for swelling, redness, warmth, or discoloration.  • Fever 101 degree or higher.  • Nausea or vomiting that persists longer than 24 hours.  • Pain not relieved by the prescribed medication.  • Heavy bleeding.  • Cloudy or foul smelling drainage.    If you have any questions, please call the Day Surgery Center at [phone].        There are several small stitches around the urethral opening.  These will be irritating, but will gradually dissolve over the next two weeks.    It is normal and expected to have some bleeding from the tip of the penis for a couple of days.    You can take a shower in 48 hrs.  Do not sit or soak in a bathtub for two weeks.     Apply the antibiotic ointment to the urethral opening 2-3x/day for a week.    This will help prevent infection as well as help prevent the stitches sticking to your under clothes.      Take the antibiotic Keflex twice a day for 3 days    Take Ibuprofen or Alleve as needed for mild to moderate pain.    Take Hydrocodone 1-2 tablets every 4-6 hrs as needed for more severe pain.    Call the office at [phone] for a follow-up appointment in 3 weeks.    Call the office for-                              -  Temp >101                              -  Pain that is not well controlled                              -  Excessive bleeding    Discharge References/Attachments     None      Pending Labs/Results     Any lab or diagnostic test results that are \"pending\" at time of discharge are listed below. If no results display then none were \"pending\" at time of discharge. Depending on when the test was completed results may be available within 3-5 days. Results can be reviewed with your provider at your next visits or through [employer]. If needed contact the provider office for additional information.         Patient Portal Signup     Manage health care for you and your family anytime, anywhere with the new [employer], your free online resource for quick and easy access to personal health information, scheduling appointments, refilling prescriptions, viewing test results, paying bills and more.  Sign up for a free and secure account. Please follow the instructions below to securely complete your enrollment.     1. Go to [email or web address]  2. Click Sign Up Now   3. Enter the Activation Code when prompted     Activation Code: [email or web address]  Expires: 3/2/2017  9:06 AM    If you have questions related to [employer], you can email [email or web address] or call [phone] to talk to our [email or web address] staff.  For questions related to your health, contact your physician’s office.  Please remember to dial 911 for medical emergencies.         Your Opinion Matters To Us  If you receive a patient satisfaction survey in the mail, please complete and return it in the postage-paid envelope.    We truly value and appreciate your feedback.           Randy Mederos        Contact your doctor for follow-up appointment if not already scheduled.     Follow-up information has not been specified.         Randy Mederos           Summary of your Discharge Medications      Take these Medications        Details    Morning Noon Evening Bedtime As  needed    atorvastatin 20 MG tablet   Commonly known as:  LIPITOR    Take 20 mg by mouth daily.                            cephALEXin 500 MG capsule   Commonly known as:  KEFLEX    Take 1 capsule by mouth 2 times daily.                                  DOCUSATE SODIUM PO                             FAM Unable to Find Medication    once. Indications: ointment for nae n control                            fluticasone 50 MCG/ACT nasal spray   Commonly known as:  FLONASE    Spray in each nostril as needed.                            FOLIC ACID PO                             hydrochlorothiazide 25 MG tablet   Commonly known as:  HYDRODIURIL    Take 25 mg by mouth daily.                            HYDROcodone-acetaminophen 5-325 MG per tablet   Commonly known as:  NORCO    Take 1-2 tablets by mouth every 4 hours as needed for Pain.                               Lactulose 20 GM/30ML Solution    Take by mouth 2 times daily.                            QUEtiapine 50 MG tablet   Commonly known as:  SEROquel    Take 50 mg by mouth 2 times daily.                            QUEtiapine 25 MG tablet   Commonly known as:  SEROquel    Take 25 mg by mouth as needed.                                                          Outpatient Administered Medication List      Notice     You have not been prescribed any facility-administered medications.       [phone]

## 2023-07-06 ENCOUNTER — APPOINTMENT (OUTPATIENT)
Dept: ANTEPARTUM | Facility: CLINIC | Age: 38
End: 2023-07-06
Payer: MEDICAID

## 2023-07-06 ENCOUNTER — APPOINTMENT (OUTPATIENT)
Dept: OBGYN | Facility: CLINIC | Age: 38
End: 2023-07-06
Payer: MEDICAID

## 2023-07-06 VITALS — SYSTOLIC BLOOD PRESSURE: 110 MMHG | WEIGHT: 166 LBS | DIASTOLIC BLOOD PRESSURE: 76 MMHG | BODY MASS INDEX: 28.49 KG/M2

## 2023-07-06 PROCEDURE — 76817 TRANSVAGINAL US OBSTETRIC: CPT

## 2023-07-06 PROCEDURE — 0502F SUBSEQUENT PRENATAL CARE: CPT

## 2023-07-06 PROCEDURE — 76815 OB US LIMITED FETUS(S): CPT

## 2023-07-18 ENCOUNTER — APPOINTMENT (OUTPATIENT)
Dept: ANTEPARTUM | Facility: CLINIC | Age: 38
End: 2023-07-18
Payer: MEDICAID

## 2023-07-18 ENCOUNTER — APPOINTMENT (OUTPATIENT)
Dept: OBGYN | Facility: CLINIC | Age: 38
End: 2023-07-18
Payer: MEDICAID

## 2023-07-18 VITALS — SYSTOLIC BLOOD PRESSURE: 115 MMHG | WEIGHT: 168 LBS | BODY MASS INDEX: 28.84 KG/M2 | DIASTOLIC BLOOD PRESSURE: 78 MMHG

## 2023-07-18 PROCEDURE — 0502F SUBSEQUENT PRENATAL CARE: CPT

## 2023-07-18 PROCEDURE — 76817 TRANSVAGINAL US OBSTETRIC: CPT

## 2023-07-18 PROCEDURE — 76816 OB US FOLLOW-UP PER FETUS: CPT

## 2023-07-19 NOTE — OB RN INTRAOPERATIVE NOTE - NSPACKS1_OBGYN_ALL_OB
None Winlevi Pregnancy And Lactation Text: This medication is considered safe during pregnancy and breastfeeding.
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
## 2023-08-03 ENCOUNTER — APPOINTMENT (OUTPATIENT)
Dept: OBGYN | Facility: CLINIC | Age: 38
End: 2023-08-03
Payer: MEDICAID

## 2023-08-03 ENCOUNTER — APPOINTMENT (OUTPATIENT)
Dept: ANTEPARTUM | Facility: CLINIC | Age: 38
End: 2023-08-03
Payer: MEDICAID

## 2023-08-03 VITALS — DIASTOLIC BLOOD PRESSURE: 74 MMHG | BODY MASS INDEX: 28.84 KG/M2 | SYSTOLIC BLOOD PRESSURE: 110 MMHG | WEIGHT: 168 LBS

## 2023-08-03 DIAGNOSIS — Z3A.21 21 WEEKS GESTATION OF PREGNANCY: ICD-10-CM

## 2023-08-03 PROCEDURE — 0502F SUBSEQUENT PRENATAL CARE: CPT

## 2023-08-03 PROCEDURE — 76817 TRANSVAGINAL US OBSTETRIC: CPT | Mod: 59

## 2023-08-03 PROCEDURE — 76811 OB US DETAILED SNGL FETUS: CPT

## 2023-08-31 ENCOUNTER — APPOINTMENT (OUTPATIENT)
Dept: OBGYN | Facility: CLINIC | Age: 38
End: 2023-08-31
Payer: MEDICAID

## 2023-08-31 VITALS
HEIGHT: 64 IN | BODY MASS INDEX: 29.19 KG/M2 | WEIGHT: 171 LBS | DIASTOLIC BLOOD PRESSURE: 72 MMHG | SYSTOLIC BLOOD PRESSURE: 103 MMHG

## 2023-08-31 PROCEDURE — 0502F SUBSEQUENT PRENATAL CARE: CPT

## 2023-09-19 ENCOUNTER — APPOINTMENT (OUTPATIENT)
Dept: OBGYN | Facility: CLINIC | Age: 38
End: 2023-09-19
Payer: MEDICAID

## 2023-09-19 VITALS — DIASTOLIC BLOOD PRESSURE: 74 MMHG | BODY MASS INDEX: 28.84 KG/M2 | SYSTOLIC BLOOD PRESSURE: 113 MMHG | WEIGHT: 168 LBS

## 2023-09-19 DIAGNOSIS — O26.899 OTHER SPECIFIED PREGNANCY RELATED CONDITIONS, UNSPECIFIED TRIMESTER: ICD-10-CM

## 2023-09-19 DIAGNOSIS — Z67.91 OTHER SPECIFIED PREGNANCY RELATED CONDITIONS, UNSPECIFIED TRIMESTER: ICD-10-CM

## 2023-09-19 PROCEDURE — 0502F SUBSEQUENT PRENATAL CARE: CPT

## 2023-09-19 RX ORDER — HUMAN RHO(D) IMMUNE GLOBULIN 300 UG/1
1500 INJECTION, SOLUTION INTRAMUSCULAR
Refills: 0 | Status: COMPLETED | OUTPATIENT
Start: 2023-09-19

## 2023-09-19 RX ADMIN — HUMAN RHO(D) IMMUNE GLOBULIN 0 UNIT: 300 INJECTION, SOLUTION INTRAMUSCULAR at 00:00

## 2023-09-28 ENCOUNTER — APPOINTMENT (OUTPATIENT)
Dept: OBGYN | Facility: CLINIC | Age: 38
End: 2023-09-28
Payer: MEDICAID

## 2023-09-28 VITALS
WEIGHT: 171 LBS | DIASTOLIC BLOOD PRESSURE: 78 MMHG | HEIGHT: 64 IN | SYSTOLIC BLOOD PRESSURE: 110 MMHG | BODY MASS INDEX: 29.19 KG/M2

## 2023-09-28 DIAGNOSIS — Z3A.28 28 WEEKS GESTATION OF PREGNANCY: ICD-10-CM

## 2023-09-28 PROCEDURE — 0502F SUBSEQUENT PRENATAL CARE: CPT

## 2023-09-29 LAB
BASOPHILS # BLD AUTO: 0.04 K/UL
BASOPHILS NFR BLD AUTO: 0.5 %
EOSINOPHIL # BLD AUTO: 0.04 K/UL
EOSINOPHIL NFR BLD AUTO: 0.5 %
HCT VFR BLD CALC: 34.9 %
HGB BLD-MCNC: 11.4 G/DL
IMM GRANULOCYTES NFR BLD AUTO: 1 %
LYMPHOCYTES # BLD AUTO: 2.03 K/UL
LYMPHOCYTES NFR BLD AUTO: 24.9 %
MAN DIFF?: NORMAL
MCHC RBC-ENTMCNC: 30.2 PG
MCHC RBC-ENTMCNC: 32.7 GM/DL
MCV RBC AUTO: 92.3 FL
MONOCYTES # BLD AUTO: 0.52 K/UL
MONOCYTES NFR BLD AUTO: 6.4 %
NEUTROPHILS # BLD AUTO: 5.45 K/UL
NEUTROPHILS NFR BLD AUTO: 66.7 %
PLATELET # BLD AUTO: 215 K/UL
RBC # BLD: 3.78 M/UL
RBC # FLD: 13.6 %
WBC # FLD AUTO: 8.16 K/UL

## 2023-10-03 DIAGNOSIS — Z34.90 ENCOUNTER FOR SUPERVISION OF NORMAL PREGNANCY, UNSPECIFIED, UNSPECIFIED TRIMESTER: ICD-10-CM

## 2023-10-03 LAB — GLUCOSE 1H P 50 G GLC PO SERPL-MCNC: 154 MG/DL

## 2023-10-12 ENCOUNTER — APPOINTMENT (OUTPATIENT)
Dept: ANTEPARTUM | Facility: CLINIC | Age: 38
End: 2023-10-12
Payer: MEDICAID

## 2023-10-12 ENCOUNTER — APPOINTMENT (OUTPATIENT)
Dept: OBGYN | Facility: CLINIC | Age: 38
End: 2023-10-12
Payer: MEDICAID

## 2023-10-12 ENCOUNTER — ASOB RESULT (OUTPATIENT)
Age: 38
End: 2023-10-12

## 2023-10-12 VITALS
DIASTOLIC BLOOD PRESSURE: 74 MMHG | SYSTOLIC BLOOD PRESSURE: 106 MMHG | HEIGHT: 64 IN | BODY MASS INDEX: 29.02 KG/M2 | WEIGHT: 170 LBS

## 2023-10-12 PROCEDURE — 0502F SUBSEQUENT PRENATAL CARE: CPT

## 2023-10-12 PROCEDURE — 76819 FETAL BIOPHYS PROFIL W/O NST: CPT | Mod: 59

## 2023-10-12 PROCEDURE — 76816 OB US FOLLOW-UP PER FETUS: CPT

## 2023-10-22 LAB
ESTIMATED AVERAGE GLUCOSE: 94 MG/DL
HBA1C MFR BLD HPLC: 4.9 %

## 2023-10-26 ENCOUNTER — APPOINTMENT (OUTPATIENT)
Dept: OBGYN | Facility: CLINIC | Age: 38
End: 2023-10-26
Payer: MEDICAID

## 2023-10-26 PROCEDURE — 0502F SUBSEQUENT PRENATAL CARE: CPT

## 2023-11-21 ENCOUNTER — APPOINTMENT (OUTPATIENT)
Dept: ANTEPARTUM | Facility: CLINIC | Age: 38
End: 2023-11-21

## 2023-11-21 ENCOUNTER — APPOINTMENT (OUTPATIENT)
Dept: OBGYN | Facility: CLINIC | Age: 38
End: 2023-11-21
Payer: MEDICAID

## 2023-11-21 VITALS — DIASTOLIC BLOOD PRESSURE: 77 MMHG | BODY MASS INDEX: 29.7 KG/M2 | WEIGHT: 173 LBS | SYSTOLIC BLOOD PRESSURE: 117 MMHG

## 2023-11-21 DIAGNOSIS — Z3A.36 36 WEEKS GESTATION OF PREGNANCY: ICD-10-CM

## 2023-11-21 PROCEDURE — 0502F SUBSEQUENT PRENATAL CARE: CPT

## 2023-11-26 LAB
B-HEM STREP SPEC QL CULT: NORMAL
BASOPHILS # BLD AUTO: 0.03 K/UL
BASOPHILS NFR BLD AUTO: 0.3 %
EOSINOPHIL # BLD AUTO: 0.07 K/UL
EOSINOPHIL NFR BLD AUTO: 0.8 %
HCT VFR BLD CALC: 35.2 %
HGB BLD-MCNC: 11.4 G/DL
HIV1+2 AB SPEC QL IA.RAPID: NONREACTIVE
IMM GRANULOCYTES NFR BLD AUTO: 0.9 %
LYMPHOCYTES # BLD AUTO: 1.77 K/UL
LYMPHOCYTES NFR BLD AUTO: 19.8 %
MAN DIFF?: NORMAL
MCHC RBC-ENTMCNC: 29.4 PG
MCHC RBC-ENTMCNC: 32.4 GM/DL
MCV RBC AUTO: 90.7 FL
MONOCYTES # BLD AUTO: 0.72 K/UL
MONOCYTES NFR BLD AUTO: 8.1 %
NEUTROPHILS # BLD AUTO: 6.26 K/UL
NEUTROPHILS NFR BLD AUTO: 70.1 %
PLATELET # BLD AUTO: 272 K/UL
RBC # BLD: 3.88 M/UL
RBC # FLD: 13.8 %
WBC # FLD AUTO: 8.93 K/UL

## 2023-11-30 ENCOUNTER — ASOB RESULT (OUTPATIENT)
Age: 38
End: 2023-11-30

## 2023-11-30 ENCOUNTER — APPOINTMENT (OUTPATIENT)
Dept: ANTEPARTUM | Facility: CLINIC | Age: 38
End: 2023-11-30
Payer: MEDICAID

## 2023-11-30 ENCOUNTER — APPOINTMENT (OUTPATIENT)
Dept: OBGYN | Facility: CLINIC | Age: 38
End: 2023-11-30
Payer: MEDICAID

## 2023-11-30 VITALS — WEIGHT: 170 LBS | DIASTOLIC BLOOD PRESSURE: 78 MMHG | SYSTOLIC BLOOD PRESSURE: 115 MMHG | BODY MASS INDEX: 29.18 KG/M2

## 2023-11-30 PROCEDURE — 76819 FETAL BIOPHYS PROFIL W/O NST: CPT | Mod: 59

## 2023-11-30 PROCEDURE — 76816 OB US FOLLOW-UP PER FETUS: CPT

## 2023-11-30 PROCEDURE — 0502F SUBSEQUENT PRENATAL CARE: CPT

## 2023-12-06 ENCOUNTER — APPOINTMENT (OUTPATIENT)
Dept: OBGYN | Facility: CLINIC | Age: 38
End: 2023-12-06
Payer: MEDICAID

## 2023-12-06 VITALS — BODY MASS INDEX: 29.18 KG/M2 | SYSTOLIC BLOOD PRESSURE: 120 MMHG | WEIGHT: 170 LBS | DIASTOLIC BLOOD PRESSURE: 85 MMHG

## 2023-12-06 PROCEDURE — 0502F SUBSEQUENT PRENATAL CARE: CPT

## 2023-12-14 ENCOUNTER — ASOB RESULT (OUTPATIENT)
Age: 38
End: 2023-12-14

## 2023-12-14 ENCOUNTER — APPOINTMENT (OUTPATIENT)
Dept: OBGYN | Facility: CLINIC | Age: 38
End: 2023-12-14
Payer: MEDICAID

## 2023-12-14 ENCOUNTER — APPOINTMENT (OUTPATIENT)
Dept: ANTEPARTUM | Facility: CLINIC | Age: 38
End: 2023-12-14
Payer: MEDICAID

## 2023-12-14 VITALS — BODY MASS INDEX: 29.35 KG/M2 | WEIGHT: 171 LBS | DIASTOLIC BLOOD PRESSURE: 76 MMHG | SYSTOLIC BLOOD PRESSURE: 115 MMHG

## 2023-12-14 PROCEDURE — 76816 OB US FOLLOW-UP PER FETUS: CPT | Mod: 59

## 2023-12-14 PROCEDURE — 76818 FETAL BIOPHYS PROFILE W/NST: CPT

## 2023-12-14 PROCEDURE — 0502F SUBSEQUENT PRENATAL CARE: CPT

## 2023-12-18 ENCOUNTER — ASOB RESULT (OUTPATIENT)
Age: 38
End: 2023-12-18

## 2023-12-18 ENCOUNTER — APPOINTMENT (OUTPATIENT)
Dept: ANTEPARTUM | Facility: CLINIC | Age: 38
End: 2023-12-18
Payer: MEDICAID

## 2023-12-18 ENCOUNTER — APPOINTMENT (OUTPATIENT)
Dept: OBGYN | Facility: CLINIC | Age: 38
End: 2023-12-18
Payer: MEDICAID

## 2023-12-18 VITALS
BODY MASS INDEX: 29.02 KG/M2 | WEIGHT: 170 LBS | HEIGHT: 64 IN | DIASTOLIC BLOOD PRESSURE: 86 MMHG | SYSTOLIC BLOOD PRESSURE: 120 MMHG

## 2023-12-18 PROCEDURE — 76818 FETAL BIOPHYS PROFILE W/NST: CPT

## 2023-12-18 PROCEDURE — 0502F SUBSEQUENT PRENATAL CARE: CPT

## 2023-12-20 ENCOUNTER — APPOINTMENT (OUTPATIENT)
Dept: ANTEPARTUM | Facility: CLINIC | Age: 38
End: 2023-12-20
Payer: MEDICAID

## 2023-12-20 ENCOUNTER — INPATIENT (INPATIENT)
Facility: HOSPITAL | Age: 38
LOS: 0 days | Discharge: HOME | End: 2023-12-21
Attending: OBSTETRICS & GYNECOLOGY | Admitting: OBSTETRICS & GYNECOLOGY
Payer: MEDICAID

## 2023-12-20 ENCOUNTER — ASOB RESULT (OUTPATIENT)
Age: 38
End: 2023-12-20

## 2023-12-20 ENCOUNTER — TRANSCRIPTION ENCOUNTER (OUTPATIENT)
Age: 38
End: 2023-12-20

## 2023-12-20 VITALS — RESPIRATION RATE: 18 BRPM | SYSTOLIC BLOOD PRESSURE: 151 MMHG | DIASTOLIC BLOOD PRESSURE: 86 MMHG | HEART RATE: 100 BPM

## 2023-12-20 DIAGNOSIS — O26.899 OTHER SPECIFIED PREGNANCY RELATED CONDITIONS, UNSPECIFIED TRIMESTER: ICD-10-CM

## 2023-12-20 DIAGNOSIS — Z98.890 OTHER SPECIFIED POSTPROCEDURAL STATES: Chronic | ICD-10-CM

## 2023-12-20 LAB
ALBUMIN SERPL ELPH-MCNC: 3.8 G/DL — SIGNIFICANT CHANGE UP (ref 3.3–5)
ALBUMIN SERPL ELPH-MCNC: 3.8 G/DL — SIGNIFICANT CHANGE UP (ref 3.3–5)
ALP SERPL-CCNC: 162 U/L — HIGH (ref 40–120)
ALP SERPL-CCNC: 162 U/L — HIGH (ref 40–120)
ALT FLD-CCNC: 5 U/L — SIGNIFICANT CHANGE UP (ref 4–33)
ALT FLD-CCNC: 5 U/L — SIGNIFICANT CHANGE UP (ref 4–33)
ANION GAP SERPL CALC-SCNC: 13 MMOL/L — SIGNIFICANT CHANGE UP (ref 7–14)
ANION GAP SERPL CALC-SCNC: 13 MMOL/L — SIGNIFICANT CHANGE UP (ref 7–14)
APTT BLD: 26.3 SEC — SIGNIFICANT CHANGE UP (ref 24.5–35.6)
APTT BLD: 26.3 SEC — SIGNIFICANT CHANGE UP (ref 24.5–35.6)
AST SERPL-CCNC: 12 U/L — SIGNIFICANT CHANGE UP (ref 4–32)
AST SERPL-CCNC: 12 U/L — SIGNIFICANT CHANGE UP (ref 4–32)
BASOPHILS # BLD AUTO: 0.05 K/UL — SIGNIFICANT CHANGE UP (ref 0–0.2)
BASOPHILS # BLD AUTO: 0.05 K/UL — SIGNIFICANT CHANGE UP (ref 0–0.2)
BASOPHILS NFR BLD AUTO: 0.6 % — SIGNIFICANT CHANGE UP (ref 0–2)
BASOPHILS NFR BLD AUTO: 0.6 % — SIGNIFICANT CHANGE UP (ref 0–2)
BILIRUB SERPL-MCNC: 0.3 MG/DL — SIGNIFICANT CHANGE UP (ref 0.2–1.2)
BILIRUB SERPL-MCNC: 0.3 MG/DL — SIGNIFICANT CHANGE UP (ref 0.2–1.2)
BLD GP AB SCN SERPL QL: NEGATIVE — SIGNIFICANT CHANGE UP
BLD GP AB SCN SERPL QL: NEGATIVE — SIGNIFICANT CHANGE UP
BUN SERPL-MCNC: 9 MG/DL — SIGNIFICANT CHANGE UP (ref 7–23)
BUN SERPL-MCNC: 9 MG/DL — SIGNIFICANT CHANGE UP (ref 7–23)
CALCIUM SERPL-MCNC: 9.2 MG/DL — SIGNIFICANT CHANGE UP (ref 8.4–10.5)
CALCIUM SERPL-MCNC: 9.2 MG/DL — SIGNIFICANT CHANGE UP (ref 8.4–10.5)
CHLORIDE SERPL-SCNC: 103 MMOL/L — SIGNIFICANT CHANGE UP (ref 98–107)
CHLORIDE SERPL-SCNC: 103 MMOL/L — SIGNIFICANT CHANGE UP (ref 98–107)
CO2 SERPL-SCNC: 21 MMOL/L — LOW (ref 22–31)
CO2 SERPL-SCNC: 21 MMOL/L — LOW (ref 22–31)
CREAT SERPL-MCNC: 0.62 MG/DL — SIGNIFICANT CHANGE UP (ref 0.5–1.3)
CREAT SERPL-MCNC: 0.62 MG/DL — SIGNIFICANT CHANGE UP (ref 0.5–1.3)
EGFR: 115 ML/MIN/1.73M2 — SIGNIFICANT CHANGE UP
EGFR: 115 ML/MIN/1.73M2 — SIGNIFICANT CHANGE UP
EOSINOPHIL # BLD AUTO: 0.06 K/UL — SIGNIFICANT CHANGE UP (ref 0–0.5)
EOSINOPHIL # BLD AUTO: 0.06 K/UL — SIGNIFICANT CHANGE UP (ref 0–0.5)
EOSINOPHIL NFR BLD AUTO: 0.7 % — SIGNIFICANT CHANGE UP (ref 0–6)
EOSINOPHIL NFR BLD AUTO: 0.7 % — SIGNIFICANT CHANGE UP (ref 0–6)
FIBRINOGEN PPP-MCNC: 459 MG/DL — SIGNIFICANT CHANGE UP (ref 200–465)
FIBRINOGEN PPP-MCNC: 459 MG/DL — SIGNIFICANT CHANGE UP (ref 200–465)
GLUCOSE SERPL-MCNC: 87 MG/DL — SIGNIFICANT CHANGE UP (ref 70–99)
GLUCOSE SERPL-MCNC: 87 MG/DL — SIGNIFICANT CHANGE UP (ref 70–99)
HCT VFR BLD CALC: 36 % — SIGNIFICANT CHANGE UP (ref 34.5–45)
HCT VFR BLD CALC: 36 % — SIGNIFICANT CHANGE UP (ref 34.5–45)
HGB BLD-MCNC: 11.8 G/DL — SIGNIFICANT CHANGE UP (ref 11.5–15.5)
HGB BLD-MCNC: 11.8 G/DL — SIGNIFICANT CHANGE UP (ref 11.5–15.5)
IANC: 5.23 K/UL — SIGNIFICANT CHANGE UP (ref 1.8–7.4)
IANC: 5.23 K/UL — SIGNIFICANT CHANGE UP (ref 1.8–7.4)
IMM GRANULOCYTES NFR BLD AUTO: 0.8 % — SIGNIFICANT CHANGE UP (ref 0–0.9)
IMM GRANULOCYTES NFR BLD AUTO: 0.8 % — SIGNIFICANT CHANGE UP (ref 0–0.9)
INR BLD: <0.9 RATIO — SIGNIFICANT CHANGE UP (ref 0.85–1.18)
INR BLD: <0.9 RATIO — SIGNIFICANT CHANGE UP (ref 0.85–1.18)
LDH SERPL L TO P-CCNC: 207 U/L — SIGNIFICANT CHANGE UP (ref 135–225)
LDH SERPL L TO P-CCNC: 207 U/L — SIGNIFICANT CHANGE UP (ref 135–225)
LYMPHOCYTES # BLD AUTO: 2.43 K/UL — SIGNIFICANT CHANGE UP (ref 1–3.3)
LYMPHOCYTES # BLD AUTO: 2.43 K/UL — SIGNIFICANT CHANGE UP (ref 1–3.3)
LYMPHOCYTES # BLD AUTO: 28.2 % — SIGNIFICANT CHANGE UP (ref 13–44)
LYMPHOCYTES # BLD AUTO: 28.2 % — SIGNIFICANT CHANGE UP (ref 13–44)
MCHC RBC-ENTMCNC: 28 PG — SIGNIFICANT CHANGE UP (ref 27–34)
MCHC RBC-ENTMCNC: 28 PG — SIGNIFICANT CHANGE UP (ref 27–34)
MCHC RBC-ENTMCNC: 32.8 GM/DL — SIGNIFICANT CHANGE UP (ref 32–36)
MCHC RBC-ENTMCNC: 32.8 GM/DL — SIGNIFICANT CHANGE UP (ref 32–36)
MCV RBC AUTO: 85.3 FL — SIGNIFICANT CHANGE UP (ref 80–100)
MCV RBC AUTO: 85.3 FL — SIGNIFICANT CHANGE UP (ref 80–100)
MONOCYTES # BLD AUTO: 0.79 K/UL — SIGNIFICANT CHANGE UP (ref 0–0.9)
MONOCYTES # BLD AUTO: 0.79 K/UL — SIGNIFICANT CHANGE UP (ref 0–0.9)
MONOCYTES NFR BLD AUTO: 9.2 % — SIGNIFICANT CHANGE UP (ref 2–14)
MONOCYTES NFR BLD AUTO: 9.2 % — SIGNIFICANT CHANGE UP (ref 2–14)
NEUTROPHILS # BLD AUTO: 5.23 K/UL — SIGNIFICANT CHANGE UP (ref 1.8–7.4)
NEUTROPHILS # BLD AUTO: 5.23 K/UL — SIGNIFICANT CHANGE UP (ref 1.8–7.4)
NEUTROPHILS NFR BLD AUTO: 60.5 % — SIGNIFICANT CHANGE UP (ref 43–77)
NEUTROPHILS NFR BLD AUTO: 60.5 % — SIGNIFICANT CHANGE UP (ref 43–77)
NRBC # BLD: 0 /100 WBCS — SIGNIFICANT CHANGE UP (ref 0–0)
NRBC # BLD: 0 /100 WBCS — SIGNIFICANT CHANGE UP (ref 0–0)
NRBC # FLD: 0 K/UL — SIGNIFICANT CHANGE UP (ref 0–0)
NRBC # FLD: 0 K/UL — SIGNIFICANT CHANGE UP (ref 0–0)
PLATELET # BLD AUTO: 234 K/UL — SIGNIFICANT CHANGE UP (ref 150–400)
PLATELET # BLD AUTO: 234 K/UL — SIGNIFICANT CHANGE UP (ref 150–400)
POTASSIUM SERPL-MCNC: 4.3 MMOL/L — SIGNIFICANT CHANGE UP (ref 3.5–5.3)
POTASSIUM SERPL-MCNC: 4.3 MMOL/L — SIGNIFICANT CHANGE UP (ref 3.5–5.3)
POTASSIUM SERPL-SCNC: 4.3 MMOL/L — SIGNIFICANT CHANGE UP (ref 3.5–5.3)
POTASSIUM SERPL-SCNC: 4.3 MMOL/L — SIGNIFICANT CHANGE UP (ref 3.5–5.3)
PROT SERPL-MCNC: 6.9 G/DL — SIGNIFICANT CHANGE UP (ref 6–8.3)
PROT SERPL-MCNC: 6.9 G/DL — SIGNIFICANT CHANGE UP (ref 6–8.3)
PROTHROM AB SERPL-ACNC: 9.7 SEC — SIGNIFICANT CHANGE UP (ref 9.5–13)
PROTHROM AB SERPL-ACNC: 9.7 SEC — SIGNIFICANT CHANGE UP (ref 9.5–13)
RBC # BLD: 4.22 M/UL — SIGNIFICANT CHANGE UP (ref 3.8–5.2)
RBC # BLD: 4.22 M/UL — SIGNIFICANT CHANGE UP (ref 3.8–5.2)
RBC # FLD: 13.5 % — SIGNIFICANT CHANGE UP (ref 10.3–14.5)
RBC # FLD: 13.5 % — SIGNIFICANT CHANGE UP (ref 10.3–14.5)
RH IG SCN BLD-IMP: NEGATIVE — SIGNIFICANT CHANGE UP
RH IG SCN BLD-IMP: NEGATIVE — SIGNIFICANT CHANGE UP
SODIUM SERPL-SCNC: 137 MMOL/L — SIGNIFICANT CHANGE UP (ref 135–145)
SODIUM SERPL-SCNC: 137 MMOL/L — SIGNIFICANT CHANGE UP (ref 135–145)
T PALLIDUM AB TITR SER: NEGATIVE — SIGNIFICANT CHANGE UP
T PALLIDUM AB TITR SER: NEGATIVE — SIGNIFICANT CHANGE UP
URATE SERPL-MCNC: 3.5 MG/DL — SIGNIFICANT CHANGE UP (ref 2.5–7)
URATE SERPL-MCNC: 3.5 MG/DL — SIGNIFICANT CHANGE UP (ref 2.5–7)
WBC # BLD: 8.63 K/UL — SIGNIFICANT CHANGE UP (ref 3.8–10.5)
WBC # BLD: 8.63 K/UL — SIGNIFICANT CHANGE UP (ref 3.8–10.5)
WBC # FLD AUTO: 8.63 K/UL — SIGNIFICANT CHANGE UP (ref 3.8–10.5)
WBC # FLD AUTO: 8.63 K/UL — SIGNIFICANT CHANGE UP (ref 3.8–10.5)

## 2023-12-20 PROCEDURE — 76815 OB US LIMITED FETUS(S): CPT | Mod: 26

## 2023-12-20 RX ORDER — KETOROLAC TROMETHAMINE 30 MG/ML
30 SYRINGE (ML) INJECTION ONCE
Refills: 0 | Status: DISCONTINUED | OUTPATIENT
Start: 2023-12-20 | End: 2023-12-20

## 2023-12-20 RX ORDER — BENZOCAINE 10 %
1 GEL (GRAM) MUCOUS MEMBRANE EVERY 6 HOURS
Refills: 0 | Status: DISCONTINUED | OUTPATIENT
Start: 2023-12-20 | End: 2023-12-21

## 2023-12-20 RX ORDER — LANOLIN
1 OINTMENT (GRAM) TOPICAL EVERY 6 HOURS
Refills: 0 | Status: DISCONTINUED | OUTPATIENT
Start: 2023-12-20 | End: 2023-12-21

## 2023-12-20 RX ORDER — SODIUM CHLORIDE 9 MG/ML
1000 INJECTION, SOLUTION INTRAVENOUS
Refills: 0 | Status: DISCONTINUED | OUTPATIENT
Start: 2023-12-20 | End: 2023-12-20

## 2023-12-20 RX ORDER — CHLORHEXIDINE GLUCONATE 213 G/1000ML
1 SOLUTION TOPICAL DAILY
Refills: 0 | Status: DISCONTINUED | OUTPATIENT
Start: 2023-12-20 | End: 2023-12-20

## 2023-12-20 RX ORDER — ACETAMINOPHEN 500 MG
975 TABLET ORAL
Refills: 0 | Status: DISCONTINUED | OUTPATIENT
Start: 2023-12-20 | End: 2023-12-21

## 2023-12-20 RX ORDER — SODIUM CHLORIDE 9 MG/ML
1000 INJECTION, SOLUTION INTRAVENOUS ONCE
Refills: 0 | Status: DISCONTINUED | OUTPATIENT
Start: 2023-12-20 | End: 2023-12-20

## 2023-12-20 RX ORDER — TETANUS TOXOID, REDUCED DIPHTHERIA TOXOID AND ACELLULAR PERTUSSIS VACCINE, ADSORBED 5; 2.5; 8; 8; 2.5 [IU]/.5ML; [IU]/.5ML; UG/.5ML; UG/.5ML; UG/.5ML
0.5 SUSPENSION INTRAMUSCULAR ONCE
Refills: 0 | Status: DISCONTINUED | OUTPATIENT
Start: 2023-12-20 | End: 2023-12-21

## 2023-12-20 RX ORDER — AER TRAVELER 0.5 G/1
1 SOLUTION RECTAL; TOPICAL EVERY 4 HOURS
Refills: 0 | Status: DISCONTINUED | OUTPATIENT
Start: 2023-12-20 | End: 2023-12-21

## 2023-12-20 RX ORDER — SODIUM CHLORIDE 9 MG/ML
3 INJECTION INTRAMUSCULAR; INTRAVENOUS; SUBCUTANEOUS EVERY 8 HOURS
Refills: 0 | Status: DISCONTINUED | OUTPATIENT
Start: 2023-12-20 | End: 2023-12-21

## 2023-12-20 RX ORDER — INFLUENZA VIRUS VACCINE 15; 15; 15; 15 UG/.5ML; UG/.5ML; UG/.5ML; UG/.5ML
0.5 SUSPENSION INTRAMUSCULAR ONCE
Refills: 0 | Status: DISCONTINUED | OUTPATIENT
Start: 2023-12-20 | End: 2023-12-21

## 2023-12-20 RX ORDER — PRAMOXINE HYDROCHLORIDE 150 MG/15G
1 AEROSOL, FOAM RECTAL EVERY 4 HOURS
Refills: 0 | Status: DISCONTINUED | OUTPATIENT
Start: 2023-12-20 | End: 2023-12-21

## 2023-12-20 RX ORDER — SIMETHICONE 80 MG/1
80 TABLET, CHEWABLE ORAL EVERY 4 HOURS
Refills: 0 | Status: DISCONTINUED | OUTPATIENT
Start: 2023-12-20 | End: 2023-12-21

## 2023-12-20 RX ORDER — OXYTOCIN 10 UNIT/ML
VIAL (ML) INJECTION
Qty: 20 | Refills: 0 | Status: DISCONTINUED | OUTPATIENT
Start: 2023-12-20 | End: 2023-12-20

## 2023-12-20 RX ORDER — OXYTOCIN 10 UNIT/ML
41.67 VIAL (ML) INJECTION
Qty: 20 | Refills: 0 | Status: DISCONTINUED | OUTPATIENT
Start: 2023-12-20 | End: 2023-12-20

## 2023-12-20 RX ORDER — IBUPROFEN 200 MG
600 TABLET ORAL EVERY 6 HOURS
Refills: 0 | Status: DISCONTINUED | OUTPATIENT
Start: 2023-12-20 | End: 2023-12-21

## 2023-12-20 RX ORDER — DIPHENHYDRAMINE HCL 50 MG
25 CAPSULE ORAL EVERY 6 HOURS
Refills: 0 | Status: DISCONTINUED | OUTPATIENT
Start: 2023-12-20 | End: 2023-12-21

## 2023-12-20 RX ORDER — HYDROCORTISONE 1 %
1 OINTMENT (GRAM) TOPICAL EVERY 6 HOURS
Refills: 0 | Status: DISCONTINUED | OUTPATIENT
Start: 2023-12-20 | End: 2023-12-21

## 2023-12-20 RX ORDER — SODIUM CHLORIDE 9 MG/ML
3 INJECTION INTRAMUSCULAR; INTRAVENOUS; SUBCUTANEOUS EVERY 8 HOURS
Refills: 0 | Status: DISCONTINUED | OUTPATIENT
Start: 2023-12-20 | End: 2023-12-20

## 2023-12-20 RX ORDER — DIBUCAINE 1 %
1 OINTMENT (GRAM) RECTAL EVERY 6 HOURS
Refills: 0 | Status: DISCONTINUED | OUTPATIENT
Start: 2023-12-20 | End: 2023-12-21

## 2023-12-20 RX ORDER — MAGNESIUM HYDROXIDE 400 MG/1
30 TABLET, CHEWABLE ORAL
Refills: 0 | Status: DISCONTINUED | OUTPATIENT
Start: 2023-12-20 | End: 2023-12-21

## 2023-12-20 RX ORDER — IBUPROFEN 200 MG
600 TABLET ORAL EVERY 6 HOURS
Refills: 0 | Status: COMPLETED | OUTPATIENT
Start: 2023-12-20 | End: 2024-11-17

## 2023-12-20 RX ADMIN — SODIUM CHLORIDE 3 MILLILITER(S): 9 INJECTION INTRAMUSCULAR; INTRAVENOUS; SUBCUTANEOUS at 20:52

## 2023-12-20 RX ADMIN — Medication 600 MILLIGRAM(S): at 18:12

## 2023-12-20 RX ADMIN — CHLORHEXIDINE GLUCONATE 1 APPLICATION(S): 213 SOLUTION TOPICAL at 08:15

## 2023-12-20 RX ADMIN — Medication 125 MILLIUNIT(S)/MIN: at 09:39

## 2023-12-20 RX ADMIN — Medication 30 MILLIGRAM(S): at 09:48

## 2023-12-20 RX ADMIN — Medication 30 MILLIGRAM(S): at 09:38

## 2023-12-20 RX ADMIN — SODIUM CHLORIDE 125 MILLILITER(S): 9 INJECTION, SOLUTION INTRAVENOUS at 08:15

## 2023-12-20 RX ADMIN — Medication 975 MILLIGRAM(S): at 13:05

## 2023-12-20 RX ADMIN — Medication 600 MILLIGRAM(S): at 19:30

## 2023-12-20 NOTE — DISCHARGE NOTE OB - CARE PROVIDERS DIRECT ADDRESSES
,[email or web address]

## 2023-12-20 NOTE — OB PROVIDER TRIAGE NOTE - NSOBPROVIDERNOTE_OBGYN_ALL_OB_FT
37yo  @ 40.6w presenting in active labor & PROM@4a requesting to TOLAC  Labile BP    - Admit to L&D  - NPO  - EFM/TOCO  - GBS negative  - IV access, CBC/T&C/RPR  - HELLP labs  - Pepcid and Bicitra as per pre-op policy  - 2 units PRBCs  - Anesthesia for epidural  - Blood transfusion consent  - Admission consents obtained   - Plan to move to OR on time schedule  - Pt seen at bedside with Dr. Ai Bautista, PAC 39yo  @ 40.6w presenting in active labor & PROM@4a requesting to TOLAC  Labile BP    - Admit to L&D  - NPO  - EFM/TOCO  - GBS negative  - IV access, CBC/T&C/RPR  - HELLP labs  - Pepcid and Bicitra as per pre-op policy  - 2 units PRBCs  - Anesthesia for epidural  - Blood transfusion consent  - Admission consents obtained   - Plan to move to OR on time schedule  - Pt seen at bedside with Dr. Ai Bautista, PAC 37yo  @ 40.6w presenting in active labor & PROM@4a requesting to TOLAC  Labile BP    - Admit to L&D  - NPO  - EFM/TOCO  - GBS negative  - IV access, CBC/T&C/RPR  - HELLP labs  - 2 units PRBCs  - Anesthesia for epidural  - Blood transfusion consent  - Admission consents obtained   - Pt seen at bedside with Dr. Ai Bautista, PAC 39yo  @ 40.6w presenting in active labor & PROM@4a requesting to TOLAC  Labile BP    - Admit to L&D  - NPO  - EFM/TOCO  - GBS negative  - IV access, CBC/T&C/RPR  - HELLP labs  - 2 units PRBCs  - Anesthesia for epidural  - Blood transfusion consent  - Admission consents obtained   - Pt seen at bedside with Dr. Ai Bautista, PAC

## 2023-12-20 NOTE — OB PROVIDER TRIAGE NOTE - NS_OBGYNHISTORY_OBGYN_ALL_OB_FT
SAB, no d&c  3/31/2008, , 6#, FT, cerlcage  2011, pC/s, twins, 34w, PPROM, breech, 3#, 4#, cerclage  2013, rC/s, 39w, 6#13, cerclage  2019, , 38w, 6#, cerclage  SAB x2, no d&c      Gyn Hx: PCOS, Denies fibroids, abnormal pap smears

## 2023-12-20 NOTE — OB RN TRIAGE NOTE - FALL HARM RISK - UNIVERSAL INTERVENTIONS
Bed in lowest position, wheels locked, appropriate side rails in place/Call bell, personal items and telephone in reach/Instruct patient to call for assistance before getting out of bed or chair/Non-slip footwear when patient is out of bed/[place] to call system/Physically safe environment - no spills, clutter or unnecessary equipment/Purposeful Proactive Rounding/Room/bathroom lighting operational, light cord in reach Bed in lowest position, wheels locked, appropriate side rails in place/Call bell, personal items and telephone in reach/Instruct patient to call for assistance before getting out of bed or chair/Non-slip footwear when patient is out of bed/[place] to call system/Physically safe environment - no spills, clutter or unnecessary equipment/Purposeful Proactive Rounding/Room/bathroom lighting operational, light cord in reach

## 2023-12-20 NOTE — OB PROVIDER H&P - ASSESSMENT
37yo  @ 40.6w presenting in active labor & PROM@4a requesting to TOLAC  Labile BP    - Admit to L&D  - NPO  - EFM/TOCO  - GBS negative  - IV access, CBC/T&C/RPR  - HELLP labs  - Pepcid and Bicitra as per pre-op policy  - 2 units PRBCs  - Anesthesia for epidural  - Blood transfusion consent  - Admission consents obtained   - Pt seen at bedside with Dr. Ai Bautista, PAC   39yo  @ 40.6w presenting in active labor & PROM@4a requesting to TOLAC  Labile BP    - Admit to L&D  - NPO  - EFM/TOCO  - GBS negative  - IV access, CBC/T&C/RPR  - HELLP labs  - Pepcid and Bicitra as per pre-op policy  - 2 units PRBCs  - Anesthesia for epidural  - Blood transfusion consent  - Admission consents obtained   - Pt seen at bedside with Dr. Ai Bautista, PAC   39yo  @ 40.6w presenting in active labor & PROM@4a requesting to TOLAC  Labile BP    - Admit to L&D  - NPO  - EFM/TOCO  - GBS negative  - IV access, CBC/T&C/RPR  - HELLP labs  - 2 units PRBCs  - Anesthesia for epidural  - Blood transfusion consent  - Admission consents obtained   - Pt seen at bedside with Dr. Ai Bautista, PAC   37yo  @ 40.6w presenting in active labor & PROM@4a requesting to TOLAC  Labile BP    - Admit to L&D  - NPO  - EFM/TOCO  - GBS negative  - IV access, CBC/T&C/RPR  - HELLP labs  - 2 units PRBCs  - Anesthesia for epidural  - Blood transfusion consent  - Admission consents obtained   - Pt seen at bedside with Dr. Ai Bautista, PAC
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
## 2023-12-20 NOTE — OB PROVIDER H&P - NS_PARA_OBGYN_ALL_OB_NU
[address] [place]  [phone]    01/19/23- Returned patient's call, no answer, voicemail left requesting a return call when available. 01/20/23- Attempted to call both patient and his wife, voicemail left requesting a return call. 2:39 PM- Spoke to patient, he requested recommendations for a new PCP. Gave him a list of PCPs to contact. He verbalized understanding and was appreciative. 4

## 2023-12-20 NOTE — OB RN PATIENT PROFILE - FUNCTIONAL ASSESSMENT - DAILY ACTIVITY 6.
Clofazimine Pregnancy And Lactation Text: This medication is Pregnancy Category C and isn't considered safe during pregnancy. It is excreted in breast milk. 4 = No assist / stand by assistance

## 2023-12-20 NOTE — DISCHARGE NOTE OB - PATIENT PORTAL LINK FT
You can access the FollowMyHealth Patient Portal offered by [facility] by registering at the following website: [email or web address]. By joining [employer]’s FollowMyHealth portal, you will also be able to view your health information using other applications (apps) compatible with our system. You can access the FollowMyHealth Patient Portal offered by [facility] by registering at the following website: [email or web address]. By joining [email or web address]’s FollowMyHealth portal, you will also be able to view your health information using other applications (apps) compatible with our system.

## 2023-12-20 NOTE — DISCHARGE NOTE OB - CARE PLAN
Principal Discharge DX:	Vaginal delivery after previous  delivery () for seventh pregnancy  Assessment and plan of treatment:	normal diet and activity   1

## 2023-12-20 NOTE — OB RN DELIVERY SUMMARY - NS_GENERALBABYACOMMENTA_OBGYN_ALL_OB_FT
mother was informed on skin to skin contact benefits many times, refused skin to skin after conversation

## 2023-12-20 NOTE — OB PROVIDER H&P - NSHPPHYSICALEXAM_GEN_ALL_CORE
Vitals: ICU Vital Signs Last 24 Hrs  T(C): --  T(F): --  HR: 93 (20 Dec 2023 08:23) (86 - 110)  BP: 128/90 (20 Dec 2023 08:13) (127/90 - 151/86)  BP(mean): --  ABP: --  ABP(mean): --  RR: 18 (20 Dec 2023 07:43) (18 - 18)  SpO2: 100% (20 Dec 2023 08:23) (99% - 100%)      General: A&O x3  Neuro: symmetrical facial features, no slurring of words  Lungs: breathing is unlabored  Extremities: full range of motion x4  Abdomen: Soft, Gravid, TOCO in place, +pfannenstiel scar     EFM: baseline , moderate variability, +accels, -decels, Category 1, reactive  TOCO: contractions noted, irregular, Q2-4 min    Limited Bedside Ultrasound: cephalic presentation, anterior placenta, Fetal HR movement seen, M-mode 129, DELROY 0  Images saved on ASOB  VE: 7/70/-2    Extremities: FROM, bilateral 1+ LE edema  Neuro: grossly intact

## 2023-12-20 NOTE — OB PROVIDER TRIAGE NOTE - HISTORY OF PRESENT ILLNESS
38yo female  JOHN 21 presents @40.6 weeks with complaints of PROM@4a followed by strong contractions. Denies shortness of breath, fever, chills or cough.  Denies vaginal bleeding today. Reports positive fetal movement.    Antepartum Course: pt declined NT and diagnostic testing, anatomy wnl, Cerclage placed @15w and removed @36.5w. GBS negative 23.  Prenatal labs:  -T&S: O-  -Rubella: Immune  -Hep B: Nonreactive  -HIV: Nonreactive  -RPR: Negative    Last EFW: 6#14 (23)

## 2023-12-20 NOTE — OB PROVIDER DELIVERY SUMMARY - NSSELHIDDEN_OBGYN_ALL_OB_FT
[NS_DeliveryAttending1_OBGYN_ALL_OB_FT:[id number]=],[NS_DeliveryAssist1_OBGYN_ALL_OB_FT:[id number]=] [NS_DeliveryAttending1_OBGYN_ALL_OB_FT:[id number]=],[NS_DeliveryAssist1_OBGYN_ALL_OB_FT:[id number]=]

## 2023-12-20 NOTE — DISCHARGE NOTE OB - NS MD DC FALL RISK RISK
For information on Fall & Injury Prevention, visit: [email or web address]/[email or web address]-health-[email or web address]-mobility OR  [email or web address].[email or web address]/[email or web address]-prevention-tips-[email or web address]-injury OR  https://www.cdc.gov/steadi/patient.html For information on Fall & Injury Prevention, visit: https://www.[facility]/news/fall-prevention-protects-and-maintains-health-and-mobility OR  https://www.[facility]/news/fall-prevention-tips-to-avoid-injury OR  https://www.cdc.gov/steadi/patient.html

## 2023-12-20 NOTE — CHART NOTE - NSCHARTNOTEFT_GEN_A_CORE
AN  Patient seen   7-8cm  CAT 1   8#  Previous c/s x 2 vd x1 and  x 1  Refuses c/s.  Desires tolac.  Reviewed catastrophic risk of uterine rupture.  Discussed increased risk after c/s x 2.  Questions answered.  Desires epidural.  EAMON Cloud

## 2023-12-20 NOTE — OB RN PATIENT PROFILE - FALL HARM RISK - UNIVERSAL INTERVENTIONS
Bed in lowest position, wheels locked, appropriate side rails in place/Call bell, personal items and telephone in reach/Instruct patient to call for assistance before getting out of bed or chair/Non-slip footwear when patient is out of bed/[place] to call system/Physically safe environment - no spills, clutter or unnecessary equipment/Purposeful Proactive Rounding/Room/bathroom lighting operational, light cord in reach Bed in lowest position, wheels locked, appropriate side rails in place/Call bell, personal items and telephone in reach/Instruct patient to call for assistance before getting out of bed or chair/Non-slip footwear when patient is out of bed/[place] to call system/Physically safe environment - no spills, clutter or unnecessary equipment/Purposeful Proactive Rounding/Room/bathroom lighting operational, light cord in reach

## 2023-12-20 NOTE — OB PROVIDER H&P - HISTORY OF PRESENT ILLNESS
38yo female  JOHN 23 presents @40.6 weeks with complaints of PROM@4a followed by strong contractions. Denies shortness of breath, fever, chills or cough.  Denies vaginal bleeding today. Reports positive fetal movement.    Antepartum Course: pt declined NT and diagnostic testing, anatomy wnl, Cerclage placed @15w and removed @36.5w. GBS negative 23.  Prenatal labs:  -T&S: O-  -Rubella: Immune  -Hep B: Nonreactive  -HIV: Nonreactive  -RPR: Negative    Last EFW: 6#14 (23)   36yo female  JOHN 23 presents @40.6 weeks with complaints of PROM@4a followed by strong contractions. Denies shortness of breath, fever, chills or cough.  Denies vaginal bleeding today. Reports positive fetal movement.    Antepartum Course: pt declined NT and diagnostic testing, anatomy wnl, Cerclage placed @15w and removed @36.5w. GBS negative 23.  Prenatal labs:  -T&S: O-  -Rubella: Immune  -Hep B: Nonreactive  -HIV: Nonreactive  -RPR: Negative    Last EFW: 6#14 (23)

## 2023-12-20 NOTE — OB RN DELIVERY SUMMARY - NSSELHIDDEN_OBGYN_ALL_OB_FT
[NS_DeliveryAttending1_OBGYN_ALL_OB_FT:[id number]=],[NS_DeliveryAssist1_OBGYN_ALL_OB_FT:[id number]=],[NS_DeliveryRN_OBGYN_ALL_OB_FT:[id number]] [NS_DeliveryAttending1_OBGYN_ALL_OB_FT:[id number]=],[NS_DeliveryAssist1_OBGYN_ALL_OB_FT:[id number]=],[NS_DeliveryRN_OBGYN_ALL_OB_FT:[id number]=]

## 2023-12-20 NOTE — PROVIDER CONTACT NOTE (OTHER) - SITUATION
Patient ambulated to the bathroom and called RN reporting large gush feeling and passing of large clot. Purple bed gretta with large [place] of blood, pad soaked along with large clot Patient ambulated to the bathroom and called RN reporting large gush feeling and passing of large clot. Purple bed gretta with large [place] of blood, pad soaked along with large clot

## 2023-12-20 NOTE — OB PROVIDER TRIAGE NOTE - NSHPPHYSICALEXAM_GEN_ALL_CORE
Vitals: ICU Vital Signs Last 24 Hrs  T(C): --  T(F): --  HR: 93 (20 Dec 2023 08:23) (86 - 110)  BP: 128/90 (20 Dec 2023 08:13) (127/90 - 151/86)  BP(mean): --  ABP: --  ABP(mean): --  RR: 18 (20 Dec 2023 07:43) (18 - 18)  SpO2: 100% (20 Dec 2023 08:23) (99% - 100%)      General: A&O x3  Neuro: symmetrical facial features, no slurring of words  Lungs: breathing is unlabored  Extremities: full range of motion x4  Abdomen: Soft, Gravid, TOCO in place, +pfannenstiel scar     EFM: baseline , moderate variability, +accels, -decels, Category 1, reactive  TOCO: contractions noted, irregular, Q2-4 min    Limited Bedside Ultrasound: cephalic presentation, anterior placenta, Fetal HR movement seen, M-mode 129, DELROY 0    Extremities: FROM, bilateral 1+ LE edema  Neuro: grossly intact

## 2023-12-20 NOTE — DISCHARGE NOTE OB - CARE PROVIDER_API CALL
Rony Ybarra  Maternal/Fetal Medicine  [address], [address]  [place] [address]  Phone: [phone]  Fax: [phone]  Follow Up Time:    Rony Ybarra  Maternal/Fetal Medicine  [address], [address]  [place] [address]  Phone: [phone]  Fax: [phone]  Follow Up Time:

## 2023-12-20 NOTE — OB PROVIDER DELIVERY SUMMARY - NSPROVIDERDELIVERYNOTE_OBGYN_ALL_OB_FT
Patient was bearing down and found to be .  The fetal head  delivered without difficulty and the anterior and posterior shoulders delivered, followed by the remaining body atraumatically. The infant emerged vigorous with good cry and was therefore placed on maternal abdomen and delayed cord clamping was performed, and then clamped and cut. Cord blood gases collected x2. The placenta delivered intact with membranes. Pitocin was administered.  Uterus massaged, fundus found to be firm. Cervix, vagina and perineum inspected 2nd degree laceration noted, repaired using vicryl and chromic in the usual fashion with good hemostasis.      Viable female infant delivered with APGARs 9/9  Mom and baby bonding well at the time of this noted    ANGIE Wallace

## 2023-12-21 ENCOUNTER — APPOINTMENT (OUTPATIENT)
Dept: ANTEPARTUM | Facility: CLINIC | Age: 38
End: 2023-12-21

## 2023-12-21 ENCOUNTER — APPOINTMENT (OUTPATIENT)
Dept: OBGYN | Facility: CLINIC | Age: 38
End: 2023-12-21

## 2023-12-21 VITALS
RESPIRATION RATE: 18 BRPM | OXYGEN SATURATION: 100 % | SYSTOLIC BLOOD PRESSURE: 117 MMHG | HEART RATE: 87 BPM | DIASTOLIC BLOOD PRESSURE: 68 MMHG | TEMPERATURE: 98 F

## 2023-12-21 LAB
KLEIHAUER-BETKE CALCULATION: 0.07 % — SIGNIFICANT CHANGE UP (ref 0–0.2)
KLEIHAUER-BETKE CALCULATION: 0.07 % — SIGNIFICANT CHANGE UP (ref 0–0.2)

## 2023-12-21 RX ORDER — IBUPROFEN 200 MG
1 TABLET ORAL
Qty: 0 | Refills: 0 | DISCHARGE
Start: 2023-12-21

## 2023-12-21 RX ORDER — ACETAMINOPHEN 500 MG
3 TABLET ORAL
Qty: 0 | Refills: 0 | DISCHARGE
Start: 2023-12-21

## 2023-12-21 RX ADMIN — Medication 975 MILLIGRAM(S): at 09:20

## 2023-12-21 RX ADMIN — Medication 975 MILLIGRAM(S): at 09:50

## 2023-12-21 NOTE — PROGRESS NOTE ADULT - SUBJECTIVE AND OBJECTIVE BOX
OB Attending Progress Note:  PPD#1    S: 39yo PPD#1 s/p . Patient feels well. Pain is well controlled. She is tolerating a regular diet and passing flatus. She is voiding spontaneously. and ambulating without difficulty. She is breastfeeding. Denies CP/SOB. Denies lightheadedness/dizziness. Denies N/V/D.    O:  Vitals:  Vital Signs Last 24 Hrs  T(C): 36.9 (21 Dec 2023 06:06), Max: 36.9 (20 Dec 2023 21:46)  T(F): 98.5 (21 Dec 2023 06:06), Max: 98.5 (20 Dec 2023 21:46)  HR: 90 (21 Dec 2023 06:06) (69 - 95)  BP: 112/70 (21 Dec 2023 06:06) (102/67 - 119/60)  BP(mean): --  RR: 18 (21 Dec 2023 06:06) (18 - 18)  SpO2: 100% (21 Dec 2023 06:06) (98% - 100%)    Parameters below as of 20 Dec 2023 18:14  Patient On (Oxygen Delivery Method): room air        MEDICATIONS  (STANDING):  acetaminophen     Tablet .. 975 milliGRAM(s) Oral <User Schedule>  diphtheria/tetanus/pertussis (acellular) Vaccine (Adacel) 0.5 milliLiter(s) IntraMuscular once  ibuprofen  Tablet. 600 milliGRAM(s) Oral every 6 hours  influenza   Vaccine 0.5 milliLiter(s) IntraMuscular once  prenatal multivitamin 1 Tablet(s) Oral daily  sodium chloride 0.9% lock flush 3 milliLiter(s) IV Push every 8 hours      Labs:  Blood type: O Negative  Rubella IgG: RPR: Negative                          11.8   8.63 >-----------< 234    (  @ 07:57 )             36.0    - @ 08:40      137  |  103  |  9   ----------------------------<  87  4.3   |  21<L>  |  0.62        Ca    9.2      20 Dec 2023 08:40    TPro  6.9  /  Alb  3.8  /  TBili  0.3  /  DBili  x   /  AST  12  /  ALT  5   /  AlkPhos  162<H>  12 @ 08:40          Physical Exam:  General: NAD  CV: RR  Pulm: Breathing comfortably on RA  Abdomen: soft, non-tender, non-distended, +BS, fundus firm  Vaginal: Lochia wnl  Extremities: No erythema/edema    A/P: 39yo PPD#1 s/p .   - Pain well controlled, continue current pain regimen  - Increase ambulation, SCDs when not ambulating  - Continue regular diet  - Discharge planning today      Maame Yarbrough MD

## 2023-12-21 NOTE — CHART NOTE - NSCHARTNOTEFT_GEN_A_CORE
Mom refusing  screen for patient. Nurse and Resident counseled mom separately on benefits of  screen, and risks of refusing  screen. Offered option to discard blood sample after  test completed, however mom still refused. Mom filled out [place] Screen refusal form, copies made and original will be sent to [facility] screening program. Mom states she will follow up with pediatrician. Mom refusing  screen for patient. Nurse and Resident counseled mom separately on benefits of  screen, and risks of refusing  screen. Offered option to discard blood sample after  test completed, however mom still refused. Mom filled out [place] Screen refusal form, copies made and original will be sent to [facility] screening program. Mom states she will follow up with pediatrician.

## 2024-01-29 NOTE — DISCHARGE NOTE ANTEPARTUM - FUNCTIONAL SCREEN CURRENT LEVEL: DRESSING, MLM
Provider at bedside for dispo and follow up. Discharge plan reviewed and paperwork signed, teaching completed including treatment received, medications and follow up plan of care, pain level within manageable comfortable limits, ambulatory to exit, gait steady, safety maintained.    
0 = independent

## 2024-01-30 NOTE — DISCHARGE NOTE ANTEPARTUM - FLU SEASON?
Discharge medications reviewed with the patient and friend and appropriate educational materials and side effects teaching were provided.    
No

## 2024-01-31 ENCOUNTER — APPOINTMENT (OUTPATIENT)
Dept: OBGYN | Facility: CLINIC | Age: 39
End: 2024-01-31
Payer: MEDICAID

## 2024-01-31 VITALS — SYSTOLIC BLOOD PRESSURE: 135 MMHG | WEIGHT: 155 LBS | BODY MASS INDEX: 26.61 KG/M2 | DIASTOLIC BLOOD PRESSURE: 92 MMHG

## 2024-01-31 PROCEDURE — 0503F POSTPARTUM CARE VISIT: CPT

## 2024-01-31 RX ORDER — ETONOGESTREL AND ETHINYL ESTRADIOL 11.7; 2.7 MG/1; MG/1
0.12-0.015 INSERT, EXTENDED RELEASE VAGINAL
Qty: 1 | Refills: 3 | Status: ACTIVE | COMMUNITY
Start: 2024-01-31 | End: 1900-01-01

## 2024-01-31 NOTE — HISTORY OF PRESENT ILLNESS
[Delivery Date: ___] : on [unfilled] [] : delivered by vaginal delivery [Female] : Delivery History: baby girl [Wt. ___] : weighing [unfilled] [Breastfeeding] : not currently nursing [Back to Normal] : is back to normal in size [Normal] : the vagina was normal [Healing Well] : is healing well [Examination Of The Breasts] : breasts are normal [Doing Well] : is doing well [No Sign of Infection] : is showing no signs of infection [Excellent Pain Control] : has excellent pain control [None] : None [FreeTextEntry8] : HPI: 39 y/o F s/p  on 2023 presenting for postpartum visit. Liveborn FEMALE. She is bottle. She is feeling well and is without complaints. Her mood is stable. [de-identified] :  [de-identified] : PP exam WNL [de-identified] : 37 y/o F presenting for 6 week PP visit -normal PP exam -Patient cleared to resume intercourse and exercise -Patient counseled on contraception options, desires nuvaring and possible IUD, discussed importance of hormones due to PCOS -f/u for 3 months for annual

## 2024-02-01 NOTE — HISTORY OF PRESENT ILLNESS
[Complications:___] : no complications [Delivery Date: ___] : on [unfilled] [] : delivered by vaginal delivery [Female] : Delivery History: baby girl [Wt. ___] : weighing [unfilled] [Breastfeeding] : not currently nursing [Back to Normal] : is back to normal in size [Normal] : the vagina was normal [Examination Of The Breasts] : breasts are normal [Doing Well] : is doing well [No Sign of Infection] : is showing no signs of infection [Excellent Pain Control] : has excellent pain control [None] : None [FreeTextEntry8] : HPI: 37 y/o F s/p  on 2023 presenting for postpartum visit. Liveborn female.  She is bottle. She is feeling well and is without complaints. Her mood is stable. [de-identified] : PP exam WNL  [de-identified] : 39 y/o F presenting for 6 week PP visit -normal PP exam -Patient cleared to resume intercourse and exercise -Patient counseled on contraception options, desires nuvaring_ -f/u for 3 months for annual

## 2024-06-21 NOTE — H&P PST ADULT - PSYCHIATRIC
Affect and characteristics of appearance, verbalizations, behaviors are appropriate negative Render Risk Assessment In Note?: no Detail Level: Simple Comment: Continue using Ketoconazole shampoo twice a week. Comment: Patient has continued to improve on Cosentyx injection. He has one active area on the right axilla. He was offered ILK today but he declined. We discussed the need to continue with doxycycline, zinc and Metformin( he has been taking it at night without GI side effects).

## 2024-12-25 NOTE — DISCHARGE NOTE OB - WEAR SUPPORTIVE BRA
Pt received on 3L NC.  Overnight Pt had desat to 86%.  Per RN O2 increased to 4L in order to maintain 91%  Respiratory will continue to monitor.      Alma Paz, RT     Statement Selected

## 2024-12-31 NOTE — OB PROVIDER TRIAGE NOTE - NS_FHRACCEL_OBGYN_ALL_OB
DATE OF PROCEDURE: 12/31/2024    Operative Note  [facility]     Margarita Pino  [id number]    SURGEON: Jhon Mccormack MD     ASSISTANT: MD Lucero    PREOPERATIVE DIAGNOSIS: Soft tissue neoplasm of back with rapid enlargement and pain.    POSTOPERATIVE DIAGNOSIS: same.     OPERATION: Excision of soft tissue neoplasm of back subfascial, intramuscular, 7cm X 6cm.     ANESTHESIA: LMAC and local.     ESTIMATED BLOOD LOSS: Minimal.     COMPLICATIONS: None.     FLUIDS: Crystalloid.     DISPOSITION: Discharged home.     SPECIMEN: Soft tissue neoplasm back.     PROCEDURE: The patient was brought into the operative suite and was placed under LMAC anesthesia; was infused with local anesthetic after being prepped and draped in a normal sterile condition.   Once this was done, approximately a 6-cm elliptical incision was made in the right posterior back. Once this was done, the incision was carried down through the dermis with electrocautery.  We dissected down through the subcutaneous tissue to the level of the fascia entered into the fascia below into the muscle belly.  We had to dissect completely around and excised an intramuscular and subfascial component in order to completely remove the mass.  The mass measured 7cm x 6cm.  This was then delivered and sent for specimen.   The wound was sterilely irrigated, and electrocautery was used to obtain hemostasis. Once this was done, deep dermal stitches were placed with a 3-0 Vicryl suture to close the fascia over the underlying muscle, and then used interrupted 3-0 Vicryl deep dermal stitches to approximate the skin. Finally, surgical glue was placed, and the patient was woken up in stable condition and taken to PACU.     Jhon Mccormack MD, MS  Minimally Invasive and Bariatric Surgery  [phone] (p)  12/31/2024  8:13 AM         Present (15 x15 bpm) [FreeTextEntry1] : Further instructions pending lab results \par Continue medications

## 2025-01-21 ENCOUNTER — APPOINTMENT (OUTPATIENT)
Dept: OBGYN | Facility: CLINIC | Age: 40
End: 2025-01-21

## 2025-04-26 NOTE — OB RN DELIVERY SUMMARY - NS_SEPSISRSKCALC_OBGYN_ALL_OB_FT
PEDIATRIC FLOOR DISCHARGE SUMMARY    Demario Jeff  2022 MRN [id number]    FLOOR Admit Date: 2025  Discharge Date: 2025  PCP: Storm Meredith MD     Admission Diagnoses: Respiratory distress    Problem List:  Principal Problem:    Respiratory distress  Resolved Problems:    * No resolved hospital problems. *      History reviewed. No pertinent past medical history.    Discharge Diagnoses:Respiratory distress secondary to a viral infection complicated by a superimposed MAYURI pneumonia and strep pharyngitis.     Inpatient Consults: none    HPI: Demario is a 2 year old male previously healthy presenting with cough, congestion, increased work of breathing x 1 day.  [facility] states patient began with mild intermittent cough yesterday in addition to rhinorrhea.  Last night he seemed to be waking up coughing a little bit more.  She did try albuterol 2 or 3 times with minimal relief.  This morning patient woke up with decreased energy, decreased p.o., and increased work of breathing.  She noted retractions and tracheal tugging so brought him to OSH ED.  No fevers.  Older brother has been sick with similar symptoms.  No rashes.  No vomiting, diarrhea.  Mom states patient did endorse sore throat today to EMS.  He did have a similar episode about a year ago in [place] when he received albuterol and steroids in the ED and was discharged home.  They were given albuterol as needed for home however have only required it twice in the past year.  No frequent nighttime awakenings.  No prior hospitalizations for respiratory distress, no PICU no intubations.  Family history of asthma in paternal grandfather.  Has no formal diagnosis of asthma.  No eczema or allergies.  Normal UOP.     ED Course:   Presented to  noted to be tachypneic and had hypoxic with retractions so sent to ED.     OSH ED: Tachypneic to 48.  Quad negative, GA S PCR positive.  Chest x-ray read noted developing infiltrate in MAYURI may be present.        Inventions: Amoxicillin 1 g, albuterol neb x 3 (5mg), Orapred 1 mg/kg x 1.  Due to retractions and tracheal tugging, patient started on HFNC 1 L/kg 21%.    Hospital Course:   Demario was admitted for respiratory distress secondary to a viral infection complicated by a superimposed MAYURI pneumonia and strep pharyngitis.     Upon arrival to the floor, patient had increased work of breathing necessitating a max of HFNC 20 L 21%.  Patient was successfully weaned to room air at 10 AM on 4/26.  Patient tolerated PO diet and was having adequate intake of fluids with appropriate urine output. Patient received 2 doses of amoxicillin 30 mg/kg TID with plans to complete a 5 day course of community acquired pneumonia followed by amoxicillin 50 mg/kg daily for 5 days to complete a 10 day course of antibiotics for strep pharyngitis. Patient did receive one albuterol 2.5 mg treatment but did not require any further doses while admitted. Asthma action plan reviewed with family. Medications were sent to patient's preferred pharmacy.   Patient was afebrile, hemodynamically stable and clinically well appearing.  Family was comfortable taking patient home.     Physical Exam:  Visit Vitals  BP 98/61 (BP Location: LUE - Left upper extremity, Patient Position: Semi-Weathers's)   Pulse 86   Temp 97.2 °F (36.2 °C) (Temporal)   Resp 26   Ht 3' 8\" (1.118 m)   Wt 20.2 kg (44 lb 8.5 oz)   SpO2 97%   BMI 16.17 kg/m²     Physical Exam  Constitutional:       General: He is not in acute distress.  HENT:      Head: Normocephalic and atraumatic.      Nose: Nose normal.      Mouth/Throat:      Mouth: Mucous membranes are moist.      Pharynx: Oropharynx is clear.      Neck: Neck supple.   Eyes:      General:         Right eye: No discharge.         Left eye: No discharge.      Extraocular Movements: Extraocular movements intact.      Conjunctiva/sclera: Conjunctivae normal.   Cardiovascular:      Rate and Rhythm: Normal rate and regular rhythm.       Pulses: Normal pulses.      Heart sounds: Normal heart sounds.   Pulmonary:      Effort: Pulmonary effort is normal. No respiratory distress, nasal flaring or retractions.      Breath sounds: Normal breath sounds. No stridor or decreased air movement. No wheezing, rhonchi or rales.   Abdominal:      General: Abdomen is flat. Bowel sounds are normal.      Palpations: Abdomen is soft.   Skin:     General: Skin is warm and dry.      Capillary Refill: Capillary refill takes less than 2 seconds.   Neurological:      Mental Status: He is alert.         Significant Labs/Imaging:  Recent Results (from the past 72 hours)   COVID/Flu/RSV panel    Collection Time: 04/25/25  4:29 PM    Specimen: Nasal, Mid-turbinate; Swab   Result Value Ref Range    Rapid SARS-COV-2 by PCR Not Detected Not Detected    Influenza A by PCR Not Detected Not Detected    Influenza B by PCR Not Detected Not Detected    RSV BY PCR Not Detected Not Detected    Isolation Guidelines      Procedural Comment     Streptococcus group A PCR    Collection Time: 04/25/25  4:29 PM    Specimen: Throat; Swab   Result Value Ref Range    STREPTOCOCCUS GROUP A PCR Detected (A) Not Detected       No orders to display       Discharge Medications:     Summary of your Discharge Medications        Take these Medications        Details   albuterol (2.5 MG/3ML) 0.083% nebulizer solution  Commonly known as: VENTOLIN   Take 3 mLs by nebulization every 4 hours as needed for Wheezing or Shortness of Breath.     amoxicillin 400 MG/5ML suspension  Commonly known as: AMOXIL  Start taking on: April 26, 2025   Take 7.5 mLs by mouth 3 times daily for 5 days, THEN 12.5 mLs daily for 5 days.     MULTIVITAMIN GUMMIES CHILDRENS PO   Take 1 Piece by mouth daily.              Discharge Follow-up:  Storm Meredith MD  [address] [facility] [address]  [phone]    Follow up in 3 day(s)  please call for appointment      No future appointments.      We personally discussed the  current management plan with patient/family, who stated understating of, and agreement with, current plan. All of their questions and concerns were addressed.    A copy of this discharge summary will be routed to the PCP via Epic for their records/to transfer care.    Thank you for allowing us to participate in the care of this patient.         Digna Thomas MD   Pediatric Chief Resident  Attending Physician       No temperature has been documented for this patient in CPN or on the OB Flowsheet. Ensure the highest temperature during labor was documented on the OB Flowsheet.  GBS status in the 'Prenatal Lab tests/results section' on the OB RN Patient Profile must be documented.   EOS calculated successfully. EOS Risk Factor: 0.5/1000 live births ([facility] national incidence); GA=40w6d; Temp=98.42; ROM=4.867; GBS='Negative'; Antibiotics='No antibiotics or any antibiotics < 2 hrs prior to birth'   EOS calculated successfully. EOS Risk Factor: 0.5/1000 live births ([facility] national incidence); GA=40w6d; Temp=98.42; ROM=4.867; GBS='Negative'; Antibiotics='No antibiotics or any antibiotics < 2 hrs prior to birth'